# Patient Record
Sex: MALE | Race: WHITE | NOT HISPANIC OR LATINO | Employment: FULL TIME | URBAN - METROPOLITAN AREA
[De-identification: names, ages, dates, MRNs, and addresses within clinical notes are randomized per-mention and may not be internally consistent; named-entity substitution may affect disease eponyms.]

---

## 2019-01-14 ENCOUNTER — HISTORICAL (OUTPATIENT)
Dept: RADIOLOGY | Facility: HOSPITAL | Age: 49
End: 2019-01-14

## 2019-01-25 ENCOUNTER — HISTORICAL (OUTPATIENT)
Dept: RADIOLOGY | Facility: HOSPITAL | Age: 49
End: 2019-01-25

## 2020-08-20 ENCOUNTER — TELEPHONE (OUTPATIENT)
Dept: GASTROENTEROLOGY | Facility: CLINIC | Age: 50
End: 2020-08-20

## 2020-08-20 NOTE — TELEPHONE ENCOUNTER
----- Message from Sanrdo Isaac sent at 8/20/2020  1:38 PM CDT -----  Contact: Patient  Pt called and would like to know if you have any idea when he may be able to get an appt with you    Pt  can be reached at [phone]

## 2020-09-14 ENCOUNTER — TELEPHONE (OUTPATIENT)
Dept: GASTROENTEROLOGY | Facility: CLINIC | Age: 50
End: 2020-09-14

## 2020-09-14 NOTE — TELEPHONE ENCOUNTER
Informed pt his referral is currently 47 out of 85 being worked in queue and that we are slowly but surely getting to the referral. Explained again that she does have a 5-6 month wait period and that we do have him on wait list.

## 2020-09-14 NOTE — TELEPHONE ENCOUNTER
----- Message from Lori Torres sent at 9/14/2020 11:02 AM CDT -----  Patient called to schedule an appointment specifically with Dr Butler  And wishes to speak with a nurse regarding this matter.          can be reached at [phone]    Thanks  KB

## 2020-09-28 ENCOUNTER — TELEPHONE (OUTPATIENT)
Dept: GASTROENTEROLOGY | Facility: CLINIC | Age: 50
End: 2020-09-28

## 2020-09-28 NOTE — TELEPHONE ENCOUNTER
Called pt and scheduled NP appointment for 9/29/20 at 4 pm. Discussed with pt that Dr. Butler is a consultant who will send them back to their general GI provider, , once their symptoms improved and plan of care is established. Pt was told the logistics of the appointment (arrival time, length of visit, and total time spent at facility). Pt was also told that Dr. Butler will review their previous workup but may order additional test and perform her own procedures and that this may require an overnight stay at a local hotel to complete the workup.

## 2020-09-28 NOTE — TELEPHONE ENCOUNTER
----- Message from Mariely Nelson MA sent at 9/25/2020  4:38 PM CDT -----    ----- Message -----  From: Balbina Gates  Sent: 9/25/2020   4:33 PM CDT  To: Carrie PERERA Staff    Good afternoon,    The referring clinic was calling regarding the current pt's referral, we let them know about the time line for new patients but they asked to see if an escalation is possible. If an escalation is not available, please reach out to referring clinic. The referral coordinator is Alana and she can be reached at [phone].    Thank you in advance!  Balbina Gates  Clinic   Ext [phone]

## 2020-09-29 ENCOUNTER — TELEPHONE (OUTPATIENT)
Dept: GASTROENTEROLOGY | Facility: HOSPITAL | Age: 50
End: 2020-09-29

## 2020-09-29 ENCOUNTER — OFFICE VISIT (OUTPATIENT)
Dept: GASTROENTEROLOGY | Facility: CLINIC | Age: 50
End: 2020-09-29
Payer: COMMERCIAL

## 2020-09-29 VITALS
HEIGHT: 72 IN | SYSTOLIC BLOOD PRESSURE: 133 MMHG | BODY MASS INDEX: 38.28 KG/M2 | HEART RATE: 73 BPM | WEIGHT: 282.63 LBS | DIASTOLIC BLOOD PRESSURE: 89 MMHG

## 2020-09-29 DIAGNOSIS — R68.81 EARLY SATIETY: ICD-10-CM

## 2020-09-29 DIAGNOSIS — R63.4 WEIGHT LOSS: ICD-10-CM

## 2020-09-29 DIAGNOSIS — R10.13 EPIGASTRIC PAIN: ICD-10-CM

## 2020-09-29 DIAGNOSIS — K22.4 ESOPHAGEAL SPASM: ICD-10-CM

## 2020-09-29 DIAGNOSIS — R13.10 DYSPHAGIA, UNSPECIFIED TYPE: Primary | ICD-10-CM

## 2020-09-29 DIAGNOSIS — K21.9 GASTROESOPHAGEAL REFLUX DISEASE, ESOPHAGITIS PRESENCE NOT SPECIFIED: ICD-10-CM

## 2020-09-29 PROCEDURE — 99245 PR OFFICE CONSULTATION,LEVEL V: ICD-10-PCS | Mod: S$GLB,,, | Performed by: INTERNAL MEDICINE

## 2020-09-29 PROCEDURE — 99999 PR PBB SHADOW E&M-EST. PATIENT-LVL V: ICD-10-PCS | Mod: PBBFAC,,, | Performed by: INTERNAL MEDICINE

## 2020-09-29 PROCEDURE — 99245 OFF/OP CONSLTJ NEW/EST HI 55: CPT | Mod: S$GLB,,, | Performed by: INTERNAL MEDICINE

## 2020-09-29 PROCEDURE — 99999 PR PBB SHADOW E&M-EST. PATIENT-LVL V: CPT | Mod: PBBFAC,,, | Performed by: INTERNAL MEDICINE

## 2020-09-29 RX ORDER — ATORVASTATIN CALCIUM 10 MG/1
TABLET, FILM COATED ORAL
COMMUNITY
Start: 2020-09-21 | End: 2021-07-14 | Stop reason: CLARIF

## 2020-09-29 RX ORDER — ATENOLOL 50 MG/1
50 TABLET ORAL DAILY
COMMUNITY

## 2020-09-29 RX ORDER — OMEPRAZOLE 40 MG/1
40 CAPSULE, DELAYED RELEASE ORAL DAILY
Status: ON HOLD | COMMUNITY
Start: 2020-08-10 | End: 2020-10-12

## 2020-09-29 RX ORDER — AMITRIPTYLINE HYDROCHLORIDE 25 MG/1
25 TABLET, FILM COATED ORAL NIGHTLY
COMMUNITY
Start: 2020-08-10 | End: 2022-11-23

## 2020-09-29 RX ORDER — DOXAZOSIN 4 MG/1
4 TABLET ORAL NIGHTLY
COMMUNITY

## 2020-09-29 RX ORDER — SILDENAFIL CITRATE 20 MG/1
20 TABLET ORAL 3 TIMES DAILY
Qty: 90 TABLET | Refills: 11 | Status: SHIPPED | OUTPATIENT
Start: 2020-09-29 | End: 2021-01-14

## 2020-09-29 RX ORDER — LISINOPRIL 40 MG/1
40 TABLET ORAL DAILY
COMMUNITY

## 2020-09-29 NOTE — LETTER
September 29, 2020      Enmanuel Sadler MD  [address]  [facility] [address]           [facility]  [address]  [facility] [address]  Phone: [phone]  Fax: [phone]          Patient: Oswaldo Chairez   MR Number: [id number]   YOB: 1970   Date of Visit: 9/29/2020       Dear Dr. Enmanuel Sadler:    Thank you for referring Oswaldo Chairez to me for evaluation. Attached you will find relevant portions of my assessment and plan of care.    If you have questions, please do not hesitate to call me. I look forward to following Oswaldo Chairez along with you.    Sincerely,    Robyn Butler MD    Enclosure  CC:  No Recipients    If you would like to receive this communication electronically, please contact [email or web address] or [phone] to request more information on [employer] access.    For providers and/or their staff who would like to refer a patient to [facility], please contact us through our one-stop-shop provider referral line, [facility], at [phone].    If you feel you have received this communication in error or would no longer like to receive these types of communications, please e-mail [email or web address]

## 2020-09-29 NOTE — TELEPHONE ENCOUNTER
MOTILITY CLINIC PROCEDURE ORDERS    CLEARANCE FOR PROCEDURES:  No needed     PROCEDURES  EGD with EndoFlip   Esophageal manometry with impedance - dysphagia       FLOOR:  4th Floor     PREP    Full liquid diet 3 days       Motility Studies (esophageal manometry/anorectal manometry)  Hold Narcotics x 1 days if able   Hold TCA x 1 days if able  Propofol only during sedation.  Discuss with Dr. Butler if additional sedation needed.     ORDER OF TESTING:  Day 1: EGD w Flip  Day 2: manometry/TBS

## 2020-09-29 NOTE — PATIENT INSTRUCTIONS
-Drink high protein shakes three times per day  - premier protein   -Try sildenafil 20mg three times per day as needed with meals for difficulty swallowing and chest pain  This medication may cause the following side effects.  Discontinue therapy and consult health care provider immediately or go to Emergency Department if you develop any pf the following side effects:   -Significant decreases in blood pressure  -Painful erection for more than 4 hours.  -Vision loss: Sudden loss of vision in one or both eyes, including permanent loss of vision  -Complete labs   -Complete EGD, EndoFlip, manometry and esophagram

## 2020-09-29 NOTE — H&P (VIEW-ONLY)
[facility] Consultation Note    Reason for Consult:    Chief Complaint   Patient presents with    Dysphagia    Heartburn    Weight Loss    Gastroesophageal Reflux         PCP:   Antonio Staples   [address] [place] [address]    Referring MD:  Enmanuel Sadler Md  [address] [address]      HPI:  Oswaldo Chairez is a 50 y.o. male referred to motility clinic for second opinion regarding the following problems:    Referral  for esophageal dysmotility.  Notes 05/28/2020:  50-year-old male with esophageal dysmotility had 100 % simultaneous contractions.  Elevated LES pressure of 72 base line and 19 for residual.  No improvement with Levsin.  Miserable and ready to do something.  This is possibly type 2 achalasia given the similar changes contractions and pan esophageal pressurization of the esophagus.  Cousin also be EG junction outflow obstruction but given there is pan esophageal pressurization this could be evolving achalasia type 2.  He lost about 10 lb and has been trying to diet.  Notes 04/06/2020:  49-year-old man complaining of esophageal spasm.  Problems in daily basis for at least 4-5 times per week.  It pain and difficulty swallowing is immediate. Ttries to drink liquid the problem will be worse and he will have to throw up.  He also gets lightheaded and almost passes out.  B.i.d. PPI has not helped.  Patient states that GERD is well controlled.  No weight loss.    GERD.   Retrosternal pyrosis: occasional   Regurgitation: few per week     Onset: most of his life, worse 2/2020  Upright symptoms: both  Nocturnal symptoms:  both  Hoarseness: no   Cough:yes  Throat clearing: occasion   Caffeine intake: 3-4 cups per day   Sleeps with head of the bed elevated. 2 pillows   Avoids eating prior to bedtime.  1.5-2h  Worse when sleep on L side    Belching Occasional. Complains that he is unable to belch when feels unwell.     Wakes up at night  coughing/wheezing, concerning for aspiration   Few per month   Progressively worse in the past few months     MEDS:   Omeprazole 40mg QHS before bed time  Alkasaltzer prn    Dysphagia.  Reports difficulty swallowing.  Problems with solids: yes - more  Problems with liquids:yes  Choking while eating:  no  Coughing wile eating: no  Frequency:  daily  Location: lower esophagus  Onset of symptoms: 2.5 yrs, worse 2/2020  History of food impactions requiring ED visit: no  History of allergies/eczema. no   No narcotics  Dilation 2/2019 without improvement    Chest pain. Reports bothersome chest pain and spasms of esophagus.   Onset: Few years ago  Lower esophagus, radiated to the back   Resolves w cold drink   Start spontaneously   Few per week   Triggers (cold fluids, caffeine, smoking, ETOH): spontanously, not w eating   Negative cardiac workup:  Negative cardiac sky. Mother w CHF. Negative stress 1/2020, recent Echo negative per pt. Cardiology does not think this is cardiac   No ASA  Has syncope/presyncope during episodes of dysphagia.  One episode of passing out if he stands up.  Told that has a vasovagal response from esophagus.     Elavil 25mg QHS - for 1 month without improvement   Omeprazole 40mg   Peppermint - uses altoids prn - not helping   Sl nitro - did not help   No nifedipine  No diltiazem  No isosorrbide dinitrate  No trazodone  No Botox    Achalasia Eckardt Score  Dysphagia  (none (0), occasional (1), daily (2), with every meal (3)): 2  Regurgitation (none (0), occasional (1), daily (2), with every meal (3)): 2  Chest pain (none (0), occasional (1), daily (2), several times per day (3)): 1  Weight loss (kg) (0 (0), <5 (1), 5-10 (2), >10 (3)): 3  Total Eckardt Score(the final score is the sum of four components (0-12)):  8    Early satiety.   Started in the past 6 month     Epigastric pain/chest pain.  Radiating to the back     Weigh loss.   295 to 275lb   20lb in 6 months   Stable in past month   No protein  [facility]      ED 5/2020 w felt unwell, anxious, lightheaded   No abd pain radiating to the back, no n/v  Told that had mild pancreatitis  Lipase elevated 254H - 5/10/2020  Ct negative      Denies nausea, vomiting, abdominal pain, bloating, diarrhea, constipation, BRBPR, melena.       Total visit time was 90 minutes, more than 50% of which was spent in face-to-face counseling with patient regarding symptoms, diagnostic results, prognosis, risks and benefits of treatment options, instructions for management, importance of compliance with chosen treatment options and coping strategies.      Previous Studies:   I spent 15 minutes on 9/29/2020 at 12:19pm reviewing Oswaldo Chairez medical records prior to clinic visit on 9/29/2020.   Chest x-ray 05/10/2020:  No acute cardiopulmonary process.  CT abdomen 05/10/2020:  No acute abdominal or pelvic inflammatory process.  9 mm left adrenal nodule statistically most likely an adenoma.   Manometry 05/18/2020:  EG junction outflow obstruction.  Some instances of intact or weak peristalsis.  IRP 19.1.  Basal LES 75.3.  100% simultaneous swallows.  DCI 1284 incomplete bolus clearance 0%.  Available manometric tracings personally reviewed by me.  EGD 02/05/2019:  Normal mucosa in the whole esophagus.  GEJ (-).  Dilation up to 54 Mongolian bougie.  Granularity, erythema and congestion in the antrum compatible with gastritis (reactive gastritis).  Normal mucosa in the whole duodenum (-).  HIDA scan 1/2019 with 18% ejection fraction but did not reproduce symptoms.     Labs hemoglobin 13.5 MCV 91.2.  Amylase and lipase normal.  CMP normal TSH 2.2    Relevant Surgical History:    None    ROS:  ROS   Constitutional: No fevers, no chills, no night sweats, + weight loss  ENT: No congestion, no rhinorrhea, no chronic sinus problems  CV: No chest pain, no palpitations  Pulm: No cough, no shortness of breath  Ophtho: No blurry vision, no eye redness  GI: see HPI  Derm: No rash  Heme: No  lymphadenopathy, no bruising  MSK: No arthritis, no joint swelling, no Raynauds  : No dysuria, no frequent urination, no blood in urine  Endo: No hot or cold intolerance  Neuro: No dizziness, no syncope, no seizure  Psych: No anxiety, no depression      Medical History:   Past Medical History:   Diagnosis Date    Esophageal spasm     HLD (hyperlipidemia)     HTN (hypertension)     HTN (hypertension)         Surgical History:   Past Surgical History:   Procedure Laterality Date    COLONOSCOPY      MINIMALLY INVASIVE MICRODISCECTOMY LUMBAR SPINE      total knee replacement      UPPER GASTROINTESTINAL ENDOSCOPY          Family History:   Family History   Problem Relation Age of Onset    Heart failure Mother     Colon cancer Father 78    Celiac disease Neg Hx     Colon polyps Neg Hx     Crohn's disease Neg Hx     Esophageal cancer Neg Hx     Cystic fibrosis Neg Hx     Hemochromatosis Neg Hx     Inflammatory bowel disease Neg Hx     Irritable bowel syndrome Neg Hx     Liver disease Neg Hx     Rectal cancer Neg Hx     Liver cancer Neg Hx     Stomach cancer Neg Hx     Ulcerative colitis Neg Hx     Tuberculosis Neg Hx     Lymphoma Neg Hx     Scleroderma Neg Hx     Rheum arthritis Neg Hx     Lupus Neg Hx     Melanoma Neg Hx     Multiple sclerosis Neg Hx     Psoriasis Neg Hx     Skin cancer Neg Hx         Social History:   Social History     Socioeconomic History    Marital status:      Spouse name: Not on file    Number of children: Not on file    Years of education: Not on file    Highest education level: Not on file   Occupational History    Not on file   Social Needs    Financial resource strain: Not on file    Food insecurity     Worry: Not on file     Inability: Not on file    Transportation needs     Medical: Not on file     Non-medical: Not on file   Tobacco Use    Smoking status: Never Smoker    Smokeless tobacco: Never Used   Substance and Sexual Activity     Alcohol use: Yes     Comment: occasional    Drug use: Never    Sexual activity: Not on file   Lifestyle    Physical activity     Days per week: Not on file     Minutes per session: Not on file    Stress: Not on file   Relationships    Social connections     Talks on phone: Not on file     Gets together: Not on file     Attends Confucianism service: Not on file     Active member of club or organization: Not on file     Attends meetings of clubs or organizations: Not on file     Relationship status: Not on file   Other Topics Concern    Not on file   Social History Narrative    Not on file        Review of patient's allergies indicates:  No Known Allergies    Current Outpatient Medications   Medication Sig Dispense Refill    atenoloL (TENORMIN) 50 MG tablet Take 50 mg by mouth once daily.      atorvastatin (LIPITOR) 10 MG tablet TAKE 1 TABLET BY MOUTH ONCE DAILY FOR 30 DAYS      doxazosin (CARDURA) 4 MG tablet Take 4 mg by mouth every evening.      lisinopriL (PRINIVIL,ZESTRIL) 40 MG tablet Take 40 mg by mouth once daily.      amitriptyline (ELAVIL) 25 MG tablet Take 25 mg by mouth nightly. at bedtime.      omeprazole (PRILOSEC) 40 MG capsule Take 40 mg by mouth once daily.      sildenafil (REVATIO) 20 mg Tab Take 1 tablet (20 mg total) by mouth 3 (three) times daily. 90 tablet 11     No current facility-administered medications for this visit.         Objective Findings:  Vital Signs:  /89   Pulse 73   Ht 6' (1.829 m)   Wt 128.2 kg (282 lb 10.1 oz)   BMI 38.33 kg/m²   Body mass index is 38.33 kg/m².    Physical Exam:  General appearance: alert, cooperative, no distress  HENT: Normocephalic, atraumatic, neck symmetrical, no nasal discharge  Eyes: conjunctivae/corneas clear, PERRL, EOM's intact  Lungs: clear to auscultation bilaterally, no dullness to percussion bilaterally  Heart: regular rate and rhythm without rub; no displacement of the PMI  Abdomen: soft, non-tender; bowel sounds normoactive;  no organomegaly  Extremities: extremities symmetric; no clubbing, cyanosis, or edema  Integument: Skin color, texture, turgor normal; no rashes; hair distrubution normal  Neurologic: Alert and oriented X 3, normal strength, normal coordination and gait  Psychiatric: no pressured speech; normal affect; no evidence of impaired cognition    Labs:   Reviewed      Assessment and Plan:  Oswaldo Chairez is a 50 y.o. male with HTN, HLD refer to motility Clinic for 2nd opinion regarding the following problems:    GERD. Pyrosis and regurgitation. Wakes up at night coughing/wheezing, concerning for aspiration.Worse when on L side  -On omeprazole 40mg at night     Dysphagia to solids and liquids. Eckardt Score 8/12.  Manometry at OSH w EGJOO and 100% simultaneous swallows   Dilation 54Fr 2/2019 without improvement  Elavil 25mg QHS - for 1 month without improvement   No improvement w peppermint  No improvement w SL nitro  -Check EGD w E/G bx, EndoFlip   -Manometry   -TBS  -Start sildenafil for esophageal spasm     Chest pain and spasms of esophagus. Radiating to the back   Resolves w cold drink   Negative cardiac workup. Cardiology does not think this is cardiac   Has syncope/presyncope during episodes of dysphagia.    -labs     New early satiety.     Anemia  -labs     CRC screening   -Defer to ref GI    Weigh loss. 20lb in 6 months. Stable in past month   Protein shakes TID     Seen in ED 5/2020 anxious, lightheaded   No abd pain radiating to the back, no n/v  Told that had mild pancreatitis  Lipase elevated 254H. Ct negative   -Def to ref GI    No follow-ups on file.    1. Dysphagia, unspecified type    2. Epigastric pain    3. Gastroesophageal reflux disease, esophagitis presence not specified    4. Esophageal spasm    5. Early satiety    6. Weight loss          Order summary:  Orders Placed This Encounter    FL Esophagram Complete    CBC auto differential    Comprehensive metabolic panel    Lipase    TSH    sildenafil  (REVATIO) 20 mg Tab    Case request GI: ESOPHAGOGASTRODUODENOSCOPY (EGD)    Case request GI: MANOMETRY-ESOPHAGEAL-WITH IMPEDANCE         Thank you so much for allowing me to participate in the care of Oswaldo Butler MD          \

## 2020-09-29 NOTE — PROGRESS NOTES
[facility] Consultation Note    Reason for Consult:    Chief Complaint   Patient presents with    Dysphagia    Heartburn    Weight Loss    Gastroesophageal Reflux         PCP:   Antonio Staples   [address] [place] [address]    Referring MD:  Enmanuel Sadler Md  [address] [address]      HPI:  Oswaldo Chairez is a 50 y.o. male referred to motility clinic for second opinion regarding the following problems:    Referral  for esophageal dysmotility.  Notes 05/28/2020:  50-year-old male with esophageal dysmotility had 100 % simultaneous contractions.  Elevated LES pressure of 72 base line and 19 for residual.  No improvement with Levsin.  Miserable and ready to do something.  This is possibly type 2 achalasia given the similar changes contractions and pan esophageal pressurization of the esophagus.  Cousin also be EG junction outflow obstruction but given there is pan esophageal pressurization this could be evolving achalasia type 2.  He lost about 10 lb and has been trying to diet.  Notes 04/06/2020:  49-year-old man complaining of esophageal spasm.  Problems in daily basis for at least 4-5 times per week.  It pain and difficulty swallowing is immediate. Ttries to drink liquid the problem will be worse and he will have to throw up.  He also gets lightheaded and almost passes out.  B.i.d. PPI has not helped.  Patient states that GERD is well controlled.  No weight loss.    GERD.   Retrosternal pyrosis: occasional   Regurgitation: few per week     Onset: most of his life, worse 2/2020  Upright symptoms: both  Nocturnal symptoms:  both  Hoarseness: no   Cough:yes  Throat clearing: occasion   Caffeine intake: 3-4 cups per day   Sleeps with head of the bed elevated. 2 pillows   Avoids eating prior to bedtime.  1.5-2h  Worse when sleep on L side    Belching Occasional. Complains that he is unable to belch when feels unwell.     Wakes up at night  coughing/wheezing, concerning for aspiration   Few per month   Progressively worse in the past few months     MEDS:   Omeprazole 40mg QHS before bed time  Alkasaltzer prn    Dysphagia.  Reports difficulty swallowing.  Problems with solids: yes - more  Problems with liquids:yes  Choking while eating:  no  Coughing wile eating: no  Frequency:  daily  Location: lower esophagus  Onset of symptoms: 2.5 yrs, worse 2/2020  History of food impactions requiring ED visit: no  History of allergies/eczema. no   No narcotics  Dilation 2/2019 without improvement    Chest pain. Reports bothersome chest pain and spasms of esophagus.   Onset: Few years ago  Lower esophagus, radiated to the back   Resolves w cold drink   Start spontaneously   Few per week   Triggers (cold fluids, caffeine, smoking, ETOH): spontanously, not w eating   Negative cardiac workup:  Negative cardiac sky. Mother w CHF. Negative stress 1/2020, recent Echo negative per pt. Cardiology does not think this is cardiac   No ASA  Has syncope/presyncope during episodes of dysphagia.  One episode of passing out if he stands up.  Told that has a vasovagal response from esophagus.     Elavil 25mg QHS - for 1 month without improvement   Omeprazole 40mg   Peppermint - uses altoids prn - not helping   Sl nitro - did not help   No nifedipine  No diltiazem  No isosorrbide dinitrate  No trazodone  No Botox    Achalasia Eckardt Score  Dysphagia  (none (0), occasional (1), daily (2), with every meal (3)): 2  Regurgitation (none (0), occasional (1), daily (2), with every meal (3)): 2  Chest pain (none (0), occasional (1), daily (2), several times per day (3)): 1  Weight loss (kg) (0 (0), <5 (1), 5-10 (2), >10 (3)): 3  Total Eckardt Score(the final score is the sum of four components (0-12)):  8    Early satiety.   Started in the past 6 month     Epigastric pain/chest pain.  Radiating to the back     Weigh loss.   295 to 275lb   20lb in 6 months   Stable in past month   No protein  [facility]      ED 5/2020 w felt unwell, anxious, lightheaded   No abd pain radiating to the back, no n/v  Told that had mild pancreatitis  Lipase elevated 254H - 5/10/2020  Ct negative      Denies nausea, vomiting, abdominal pain, bloating, diarrhea, constipation, BRBPR, melena.       Total visit time was 90 minutes, more than 50% of which was spent in face-to-face counseling with patient regarding symptoms, diagnostic results, prognosis, risks and benefits of treatment options, instructions for management, importance of compliance with chosen treatment options and coping strategies.      Previous Studies:   I spent 15 minutes on 9/29/2020 at 12:19pm reviewing Oswaldo Chairez medical records prior to clinic visit on 9/29/2020.   Chest x-ray 05/10/2020:  No acute cardiopulmonary process.  CT abdomen 05/10/2020:  No acute abdominal or pelvic inflammatory process.  9 mm left adrenal nodule statistically most likely an adenoma.   Manometry 05/18/2020:  EG junction outflow obstruction.  Some instances of intact or weak peristalsis.  IRP 19.1.  Basal LES 75.3.  100% simultaneous swallows.  DCI 1284 incomplete bolus clearance 0%.  Available manometric tracings personally reviewed by me.  EGD 02/05/2019:  Normal mucosa in the whole esophagus.  GEJ (-).  Dilation up to 54 Bengali bougie.  Granularity, erythema and congestion in the antrum compatible with gastritis (reactive gastritis).  Normal mucosa in the whole duodenum (-).  HIDA scan 1/2019 with 18% ejection fraction but did not reproduce symptoms.     Labs hemoglobin 13.5 MCV 91.2.  Amylase and lipase normal.  CMP normal TSH 2.2    Relevant Surgical History:    None    ROS:  ROS   Constitutional: No fevers, no chills, no night sweats, + weight loss  ENT: No congestion, no rhinorrhea, no chronic sinus problems  CV: No chest pain, no palpitations  Pulm: No cough, no shortness of breath  Ophtho: No blurry vision, no eye redness  GI: see HPI  Derm: No rash  Heme: No  lymphadenopathy, no bruising  MSK: No arthritis, no joint swelling, no Raynauds  : No dysuria, no frequent urination, no blood in urine  Endo: No hot or cold intolerance  Neuro: No dizziness, no syncope, no seizure  Psych: No anxiety, no depression      Medical History:   Past Medical History:   Diagnosis Date    Esophageal spasm     HLD (hyperlipidemia)     HTN (hypertension)     HTN (hypertension)         Surgical History:   Past Surgical History:   Procedure Laterality Date    COLONOSCOPY      MINIMALLY INVASIVE MICRODISCECTOMY LUMBAR SPINE      total knee replacement      UPPER GASTROINTESTINAL ENDOSCOPY          Family History:   Family History   Problem Relation Age of Onset    Heart failure Mother     Colon cancer Father 78    Celiac disease Neg Hx     Colon polyps Neg Hx     Crohn's disease Neg Hx     Esophageal cancer Neg Hx     Cystic fibrosis Neg Hx     Hemochromatosis Neg Hx     Inflammatory bowel disease Neg Hx     Irritable bowel syndrome Neg Hx     Liver disease Neg Hx     Rectal cancer Neg Hx     Liver cancer Neg Hx     Stomach cancer Neg Hx     Ulcerative colitis Neg Hx     Tuberculosis Neg Hx     Lymphoma Neg Hx     Scleroderma Neg Hx     Rheum arthritis Neg Hx     Lupus Neg Hx     Melanoma Neg Hx     Multiple sclerosis Neg Hx     Psoriasis Neg Hx     Skin cancer Neg Hx         Social History:   Social History     Socioeconomic History    Marital status:      Spouse name: Not on file    Number of children: Not on file    Years of education: Not on file    Highest education level: Not on file   Occupational History    Not on file   Social Needs    Financial resource strain: Not on file    Food insecurity     Worry: Not on file     Inability: Not on file    Transportation needs     Medical: Not on file     Non-medical: Not on file   Tobacco Use    Smoking status: Never Smoker    Smokeless tobacco: Never Used   Substance and Sexual Activity     Alcohol use: Yes     Comment: occasional    Drug use: Never    Sexual activity: Not on file   Lifestyle    Physical activity     Days per week: Not on file     Minutes per session: Not on file    Stress: Not on file   Relationships    Social connections     Talks on phone: Not on file     Gets together: Not on file     Attends Confucianist service: Not on file     Active member of club or organization: Not on file     Attends meetings of clubs or organizations: Not on file     Relationship status: Not on file   Other Topics Concern    Not on file   Social History Narrative    Not on file        Review of patient's allergies indicates:  No Known Allergies    Current Outpatient Medications   Medication Sig Dispense Refill    atenoloL (TENORMIN) 50 MG tablet Take 50 mg by mouth once daily.      atorvastatin (LIPITOR) 10 MG tablet TAKE 1 TABLET BY MOUTH ONCE DAILY FOR 30 DAYS      doxazosin (CARDURA) 4 MG tablet Take 4 mg by mouth every evening.      lisinopriL (PRINIVIL,ZESTRIL) 40 MG tablet Take 40 mg by mouth once daily.      amitriptyline (ELAVIL) 25 MG tablet Take 25 mg by mouth nightly. at bedtime.      omeprazole (PRILOSEC) 40 MG capsule Take 40 mg by mouth once daily.      sildenafil (REVATIO) 20 mg Tab Take 1 tablet (20 mg total) by mouth 3 (three) times daily. 90 tablet 11     No current facility-administered medications for this visit.         Objective Findings:  Vital Signs:  /89   Pulse 73   Ht 6' (1.829 m)   Wt 128.2 kg (282 lb 10.1 oz)   BMI 38.33 kg/m²   Body mass index is 38.33 kg/m².    Physical Exam:  General appearance: alert, cooperative, no distress  HENT: Normocephalic, atraumatic, neck symmetrical, no nasal discharge  Eyes: conjunctivae/corneas clear, PERRL, EOM's intact  Lungs: clear to auscultation bilaterally, no dullness to percussion bilaterally  Heart: regular rate and rhythm without rub; no displacement of the PMI  Abdomen: soft, non-tender; bowel sounds normoactive;  no organomegaly  Extremities: extremities symmetric; no clubbing, cyanosis, or edema  Integument: Skin color, texture, turgor normal; no rashes; hair distrubution normal  Neurologic: Alert and oriented X 3, normal strength, normal coordination and gait  Psychiatric: no pressured speech; normal affect; no evidence of impaired cognition    Labs:   Reviewed      Assessment and Plan:  Oswaldo Chairez is a 50 y.o. male with HTN, HLD refer to motility Clinic for 2nd opinion regarding the following problems:    GERD. Pyrosis and regurgitation. Wakes up at night coughing/wheezing, concerning for aspiration.Worse when on L side  -On omeprazole 40mg at night     Dysphagia to solids and liquids. Eckardt Score 8/12.  Manometry at OSH w EGJOO and 100% simultaneous swallows   Dilation 54Fr 2/2019 without improvement  Elavil 25mg QHS - for 1 month without improvement   No improvement w peppermint  No improvement w SL nitro  -Check EGD w E/G bx, EndoFlip   -Manometry   -TBS  -Start sildenafil for esophageal spasm     Chest pain and spasms of esophagus. Radiating to the back   Resolves w cold drink   Negative cardiac workup. Cardiology does not think this is cardiac   Has syncope/presyncope during episodes of dysphagia.    -labs     New early satiety.     Anemia  -labs     CRC screening   -Defer to ref GI    Weigh loss. 20lb in 6 months. Stable in past month   Protein shakes TID     Seen in ED 5/2020 anxious, lightheaded   No abd pain radiating to the back, no n/v  Told that had mild pancreatitis  Lipase elevated 254H. Ct negative   -Def to ref GI    No follow-ups on file.    1. Dysphagia, unspecified type    2. Epigastric pain    3. Gastroesophageal reflux disease, esophagitis presence not specified    4. Esophageal spasm    5. Early satiety    6. Weight loss          Order summary:  Orders Placed This Encounter    FL Esophagram Complete    CBC auto differential    Comprehensive metabolic panel    Lipase    TSH    sildenafil  (REVATIO) 20 mg Tab    Case request GI: ESOPHAGOGASTRODUODENOSCOPY (EGD)    Case request GI: MANOMETRY-ESOPHAGEAL-WITH IMPEDANCE         Thank you so much for allowing me to participate in the care of Oswaldo Butler MD          \

## 2020-09-30 ENCOUNTER — TELEPHONE (OUTPATIENT)
Dept: ENDOSCOPY | Facility: HOSPITAL | Age: 50
End: 2020-09-30

## 2020-09-30 NOTE — TELEPHONE ENCOUNTER
Patient is scheduled for EGD on 10/12/20 and Manometry on 10/13/20 at 10:00am will be done by 12p.    He would like to coordinate the rest of his tests on that Tuesday after his procedures.

## 2020-09-30 NOTE — TELEPHONE ENCOUNTER
Instructions for procedures with Dr. Butler on 10/12 and 10/13 emailed to [email or web address]

## 2020-09-30 NOTE — TELEPHONE ENCOUNTER
I would have to move both procedures bc his COVID test is going to cover him for both so I cannot move his Manometry back to the 14th.    Have you talked to him and see if he wants to do the esophogram a separate day or move everything again?

## 2020-09-30 NOTE — TELEPHONE ENCOUNTER
If he wants to move them all to have together it wont be until Nov 2 and 3rd or Nov 16th and 17th weeks

## 2020-09-30 NOTE — TELEPHONE ENCOUNTER
Called patient regarding orders with Dr. Butler for procedures. No answer and message was left for him to call my direct line back to schedule

## 2020-10-06 ENCOUNTER — TELEPHONE (OUTPATIENT)
Dept: GASTROENTEROLOGY | Facility: CLINIC | Age: 50
End: 2020-10-06

## 2020-10-06 NOTE — TELEPHONE ENCOUNTER
Labs at the outside hospital 09/30/2020:  CMP normal  TSH normal  CBC normal   lipase 216 high (13-78)      Please let patient know that his labs show elevated lipase.  He should address this with the referring GI doctor.  Please fax labs to referring GI

## 2020-10-12 ENCOUNTER — ANESTHESIA EVENT (OUTPATIENT)
Dept: ENDOSCOPY | Facility: HOSPITAL | Age: 50
End: 2020-10-12
Payer: COMMERCIAL

## 2020-10-12 ENCOUNTER — TELEPHONE (OUTPATIENT)
Dept: GASTROENTEROLOGY | Facility: CLINIC | Age: 50
End: 2020-10-12

## 2020-10-12 ENCOUNTER — HOSPITAL ENCOUNTER (OUTPATIENT)
Facility: HOSPITAL | Age: 50
Discharge: HOME | End: 2020-10-12
Attending: INTERNAL MEDICINE | Admitting: INTERNAL MEDICINE
Payer: COMMERCIAL

## 2020-10-12 ENCOUNTER — ANESTHESIA (OUTPATIENT)
Dept: ENDOSCOPY | Facility: HOSPITAL | Age: 50
End: 2020-10-12
Payer: COMMERCIAL

## 2020-10-12 VITALS
BODY MASS INDEX: 37.93 KG/M2 | TEMPERATURE: 98 F | SYSTOLIC BLOOD PRESSURE: 130 MMHG | RESPIRATION RATE: 16 BRPM | HEART RATE: 63 BPM | DIASTOLIC BLOOD PRESSURE: 88 MMHG | HEIGHT: 72 IN | WEIGHT: 280 LBS | OXYGEN SATURATION: 98 %

## 2020-10-12 DIAGNOSIS — R13.10 DYSPHAGIA: ICD-10-CM

## 2020-10-12 DIAGNOSIS — R13.10 DYSPHAGIA, UNSPECIFIED TYPE: Primary | ICD-10-CM

## 2020-10-12 LAB — SARS-COV-2 RDRP RESP QL NAA+PROBE: NEGATIVE

## 2020-10-12 PROCEDURE — E9220 PRA ENDO ANESTHESIA: HCPCS | Mod: ,,, | Performed by: NURSE ANESTHETIST, CERTIFIED REGISTERED

## 2020-10-12 PROCEDURE — 43239 PR EGD, FLEX, W/BIOPSY, SGL/MULTI: ICD-10-PCS | Mod: ,,, | Performed by: INTERNAL MEDICINE

## 2020-10-12 PROCEDURE — 91040 ESOPH BALLOON DISTENSION TST: CPT | Performed by: INTERNAL MEDICINE

## 2020-10-12 PROCEDURE — 27201012 HC FORCEPS, HOT/COLD, DISP: Performed by: INTERNAL MEDICINE

## 2020-10-12 PROCEDURE — 91037 ESOPH IMPED FUNCTION TEST: CPT | Performed by: INTERNAL MEDICINE

## 2020-10-12 PROCEDURE — 91040 ESOPH BALLOON DISTENSION TST: CPT | Mod: 26,,, | Performed by: INTERNAL MEDICINE

## 2020-10-12 PROCEDURE — C1726 CATH, BAL DIL, NON-VASCULAR: HCPCS | Performed by: INTERNAL MEDICINE

## 2020-10-12 PROCEDURE — 88305 TISSUE EXAM BY PATHOLOGIST: ICD-10-PCS | Mod: 26,,, | Performed by: PATHOLOGY

## 2020-10-12 PROCEDURE — E9220 PRA ENDO ANESTHESIA: ICD-10-PCS | Mod: ,,, | Performed by: NURSE ANESTHETIST, CERTIFIED REGISTERED

## 2020-10-12 PROCEDURE — 37000008 HC ANESTHESIA 1ST 15 MINUTES: Performed by: INTERNAL MEDICINE

## 2020-10-12 PROCEDURE — 63600175 PHARM REV CODE 636 W HCPCS: Performed by: NURSE ANESTHETIST, CERTIFIED REGISTERED

## 2020-10-12 PROCEDURE — 43239 EGD BIOPSY SINGLE/MULTIPLE: CPT | Mod: ,,, | Performed by: INTERNAL MEDICINE

## 2020-10-12 PROCEDURE — 25000003 PHARM REV CODE 250: Performed by: INTERNAL MEDICINE

## 2020-10-12 PROCEDURE — 43239 EGD BIOPSY SINGLE/MULTIPLE: CPT | Performed by: INTERNAL MEDICINE

## 2020-10-12 PROCEDURE — U0002 COVID-19 LAB TEST NON-CDC: HCPCS

## 2020-10-12 PROCEDURE — 91038 ESOPH IMPED FUNCT TEST > 1HR: CPT | Performed by: INTERNAL MEDICINE

## 2020-10-12 PROCEDURE — 91040 PR ESOPH BALLOON DISTENSION TST: ICD-10-PCS | Mod: 26,,, | Performed by: INTERNAL MEDICINE

## 2020-10-12 PROCEDURE — 37000009 HC ANESTHESIA EA ADD 15 MINS: Performed by: INTERNAL MEDICINE

## 2020-10-12 PROCEDURE — 88305 TISSUE EXAM BY PATHOLOGIST: CPT | Mod: 26,,, | Performed by: PATHOLOGY

## 2020-10-12 PROCEDURE — 88305 TISSUE EXAM BY PATHOLOGIST: CPT | Mod: 59 | Performed by: PATHOLOGY

## 2020-10-12 RX ORDER — PROPOFOL 10 MG/ML
VIAL (ML) INTRAVENOUS CONTINUOUS PRN
Status: DISCONTINUED | OUTPATIENT
Start: 2020-10-12 | End: 2020-10-12

## 2020-10-12 RX ORDER — OMEPRAZOLE 40 MG/1
40 CAPSULE, DELAYED RELEASE ORAL EVERY MORNING
Qty: 90 CAPSULE | Refills: 3 | Status: SHIPPED | OUTPATIENT
Start: 2020-10-12 | End: 2022-11-23

## 2020-10-12 RX ORDER — SODIUM CHLORIDE 9 MG/ML
INJECTION, SOLUTION INTRAVENOUS CONTINUOUS
Status: DISCONTINUED | OUTPATIENT
Start: 2020-10-12 | End: 2020-10-12 | Stop reason: HOSPADM

## 2020-10-12 RX ORDER — PROPOFOL 10 MG/ML
VIAL (ML) INTRAVENOUS
Status: DISCONTINUED | OUTPATIENT
Start: 2020-10-12 | End: 2020-10-12

## 2020-10-12 RX ORDER — LIDOCAINE HCL/PF 100 MG/5ML
SYRINGE (ML) INTRAVENOUS
Status: DISCONTINUED | OUTPATIENT
Start: 2020-10-12 | End: 2020-10-12

## 2020-10-12 RX ORDER — SODIUM CHLORIDE 0.9 % (FLUSH) 0.9 %
10 SYRINGE (ML) INJECTION
Status: DISCONTINUED | OUTPATIENT
Start: 2020-10-12 | End: 2020-10-12 | Stop reason: HOSPADM

## 2020-10-12 RX ADMIN — Medication 100 MG: at 01:10

## 2020-10-12 RX ADMIN — SODIUM CHLORIDE: 0.9 INJECTION, SOLUTION INTRAVENOUS at 01:10

## 2020-10-12 RX ADMIN — PROPOFOL 200 MCG/KG/MIN: 10 INJECTION, EMULSION INTRAVENOUS at 01:10

## 2020-10-12 RX ADMIN — SODIUM CHLORIDE: 0.9 INJECTION, SOLUTION INTRAVENOUS at 12:10

## 2020-10-12 RX ADMIN — PROPOFOL 150 MG: 10 INJECTION, EMULSION INTRAVENOUS at 01:10

## 2020-10-12 NOTE — ANESTHESIA PREPROCEDURE EVALUATION
10/12/2020  Oswaldo Chairez is a 50 y.o., male.  There is no problem list on file for this patient.       Past Medical History:   Diagnosis Date    Esophageal spasm     HLD (hyperlipidemia)     HTN (hypertension)     HTN (hypertension)          Anesthesia Evaluation    I have reviewed the Patient Summary Reports.      I have reviewed the Medications.     Review of Systems  Anesthesia Hx:  No problems with previous Anesthesia  Denies Family Hx of Anesthesia complications.   Denies Personal Hx of Anesthesia complications.   Hematology/Oncology:  Hematology Normal   Oncology Normal     EENT/Dental:EENT/Dental Normal   Cardiovascular:   Exercise tolerance: good Hypertension    Pulmonary:  Pulmonary Normal    Renal/:  Renal/ Normal     Hepatic/GI:  Hepatic/GI Normal    Musculoskeletal:  Musculoskeletal Normal    Neurological:  Neurology Normal    Endocrine:  Endocrine Normal    Dermatological:  Skin Normal    Psych:  Psychiatric Normal           Physical Exam  General:  Morbid Obesity    Airway/Jaw/Neck:  Airway Findings: Mouth Opening: Normal Tongue: Normal  General Airway Assessment: Adult  TM Distance: Normal, at least 6 cm       Chest/Lungs:  Chest/Lungs Clear    Heart/Vascular:  Heart Findings: Normal Heart murmur: negative            Anesthesia Plan  Type of Anesthesia, risks & benefits discussed:  Anesthesia Type:  general  Patient's Preference:   Intra-op Monitoring Plan: standard ASA monitors  Intra-op Monitoring Plan Comments:   Post Op Pain Control Plan:   Post Op Pain Control Plan Comments:   Induction:   IV  Beta Blocker:  Patient is on a Beta-Blocker and has received one dose within the past 24 hours (No further documentation required).       Informed Consent: Patient understands risks and agrees with Anesthesia plan.  Questions answered. Anesthesia consent signed with patient.  ASA Score: 3      Day of Surgery Review of History & Physical:    H&P update referred to the surgeon.         Ready For Surgery From Anesthesia Perspective.

## 2020-10-12 NOTE — TRANSFER OF CARE
Anesthesia Transfer of Care Note    Patient: Oswaldo Chairez    Procedure(s) Performed: Procedure(s) (LRB):  ESOPHAGOGASTRODUODENOSCOPY (EGD) (N/A)    Patient location: PACU    Anesthesia Type: general    Transport from OR: Transported from OR on room air with adequate spontaneous ventilation    Post pain: adequate analgesia    Post assessment: no apparent anesthetic complications and tolerated procedure well    Post vital signs: stable    Level of consciousness: sedated    Nausea/Vomiting: no nausea/vomiting    Complications: none    Transfer of care protocol was followed      Last vitals:   Visit Vitals  /86 (BP Location: Left arm, Patient Position: Lying)   Pulse 71   Temp 36.4 °C (97.5 °F) (Oral)   Resp 16   Ht 6' (1.829 m)   Wt 127 kg (280 lb)   SpO2 99%   BMI 37.97 kg/m²

## 2020-10-12 NOTE — INTERVAL H&P NOTE
Pre-Procedure H and P Addendum    Patient seen and examined.  History and exam unchanged from prior history and physical.      Procedure: EGD w Endoflip  Indication: dysphagia/chest pain  ASA Class: per anesthesiology  Airway: per anesthesia  Neck Mobility: full range of motion  Mallampatti score: per anesthesia  History of anesthesia problems: no  Family history of anesthesia problems: no  Anesthesia Plan: per anesthesia          There are no hospital problems to display for this patient.

## 2020-10-12 NOTE — [employer] PATIENT INSTRUCTIONS
Discharge Summary/Instructions after an Endoscopic Procedure  Patient Name: Oswaldo Chairez  Patient MRN: [id number]  Patient YOB: 1970 Monday, October 12, 2020  Robyn Butler MD  RESTRICTIONS:  During your procedure today, you received medications for sedation.  These   medications may affect your judgment, balance and coordination.  Therefore,   for 24 hours, you have the following restrictions:   - DO NOT drive a car, operate machinery, make legal/financial decisions,   sign important papers or drink alcohol.    ACTIVITY:  Today: no heavy lifting, straining or running due to procedural   sedation/anesthesia.  The following day: return to full activity including work.  DIET:  Eat and drink normally unless instructed otherwise.     TREATMENT FOR COMMON SIDE EFFECTS:  - Mild abdominal pain, nausea, belching, bloating or excessive gas:  rest,   eat lightly and use a heating pad.  - Sore Throat: treat with throat lozenges and/or gargle with warm salt   water.  - Because air was used during the procedure, expelling large amounts of air   from your rectum or belching is normal.  - If a bowel prep was taken, you may not have a bowel movement for 1-3 days.    This is normal.  SYMPTOMS TO WATCH FOR AND REPORT TO YOUR PHYSICIAN:  1. Abdominal pain or bloating, other than gas cramps.  2. Chest pain.  3. Back pain.  4. Signs of infection such as: chills or fever occurring within 24 hours   after the procedure.  5. Rectal bleeding, which would show as bright red, maroon, or black stools.   (A tablespoon of blood from the rectum is not serious, especially if   hemorrhoids are present.)  6. Vomiting.  7. Weakness or dizziness.  GO DIRECTLY TO THE NEAREST EMERGENCY ROOM IF YOU HAVE ANY OF THE FOLLOWING:      Difficulty breathing              Chills and/or fever over 101 F   Persistent vomiting and/or vomiting blood   Severe abdominal pain   Severe chest pain   Black, tarry stools   Bleeding- more than one tablespoon   Any  other symptom or condition that you feel may need urgent attention  Your doctor recommends these additional instructions:  If any biopsies were taken, your doctors clinic will contact you in 1 to 2   weeks with any results.  - Discharge patient to home (with escort).   - Resume previous diet.   - Continue present medications.   - Await pathology results.   - The findings and recommendations were discussed with the patient.   - Patient has a contact number available for emergencies.  The signs and   symptoms of potential delayed complications were discussed with the   patient.  Return to normal activities tomorrow.  Written discharge   instructions were provided to the patient.  For questions, problems or results please call your physician - Robyn Butler MD at Work:  [phone].  [facility], EMERGENCY ROOM PHONE NUMBER: [phone]  IF A COMPLICATION OR EMERGENCY SITUATION ARISES AND YOU ARE UNABLE TO REACH   YOUR PHYSICIAN - GO DIRECTLY TO THE EMERGENCY ROOM.  Robyn Butler MD  10/12/2020 1:42:59 PM  This report has been verified and signed electronically.  [employer]

## 2020-10-12 NOTE — TELEPHONE ENCOUNTER
EGD w superficial erosions  PT on ibuprofen and alleve and tylenol for knee pain   Knee replacement scheduled soon   Advised to limit NSAIDS  Will start omeprazole 40mg daily   pls remind pt

## 2020-10-12 NOTE — ANESTHESIA POSTPROCEDURE EVALUATION
Anesthesia Post Evaluation    Patient: Oswaldo Chairez    Procedure(s) Performed: Procedure(s) (LRB):  ESOPHAGOGASTRODUODENOSCOPY (EGD) (N/A)    Final Anesthesia Type: general    Patient location during evaluation: GI PACU  Patient participation: Yes- Able to Participate  Level of consciousness: awake and alert, awake and oriented  Post-procedure vital signs: reviewed and stable  Pain management: adequate  Airway patency: patent    PONV status at discharge: No PONV  Anesthetic complications: no      Cardiovascular status: blood pressure returned to baseline, stable and hemodynamically stable  Respiratory status: unassisted, spontaneous ventilation and room air  Hydration status: euvolemic  Follow-up not needed.          Vitals Value Taken Time   /88 10/12/20 1404   Temp 36.9 °C (98.4 °F) 10/12/20 1334   Pulse 63 10/12/20 1404   Resp 16 10/12/20 1404   SpO2 98 % 10/12/20 1404         Event Time   Out of Recovery 14:13:34         Pain/Joanna Score: Joanna Score: 10 (10/12/2020  2:05 PM)

## 2020-10-13 ENCOUNTER — HOSPITAL ENCOUNTER (OUTPATIENT)
Facility: HOSPITAL | Age: 50
Discharge: HOME | End: 2020-10-13
Attending: INTERNAL MEDICINE | Admitting: INTERNAL MEDICINE
Payer: COMMERCIAL

## 2020-10-13 VITALS
SYSTOLIC BLOOD PRESSURE: 137 MMHG | OXYGEN SATURATION: 99 % | HEIGHT: 72 IN | HEART RATE: 64 BPM | DIASTOLIC BLOOD PRESSURE: 84 MMHG | RESPIRATION RATE: 18 BRPM | TEMPERATURE: 98 F | WEIGHT: 280 LBS | BODY MASS INDEX: 37.93 KG/M2

## 2020-10-13 DIAGNOSIS — R13.10 DYSPHAGIA: ICD-10-CM

## 2020-10-13 PROCEDURE — 25000003 PHARM REV CODE 250: Performed by: INTERNAL MEDICINE

## 2020-10-13 PROCEDURE — 91037 ESOPH IMPED FUNCTION TEST: CPT | Performed by: INTERNAL MEDICINE

## 2020-10-13 PROCEDURE — 91037 PR GERD TST W/ NASAL IMPEDENCE ELECTROD: ICD-10-PCS | Mod: 26,,, | Performed by: INTERNAL MEDICINE

## 2020-10-13 PROCEDURE — 91010 ESOPHAGUS MOTILITY STUDY: CPT | Performed by: INTERNAL MEDICINE

## 2020-10-13 PROCEDURE — 91037 ESOPH IMPED FUNCTION TEST: CPT | Mod: 26,,, | Performed by: INTERNAL MEDICINE

## 2020-10-13 PROCEDURE — 91010 PR ESOPHAGEAL MOTILITY STUDY, MA2METRY: ICD-10-PCS | Mod: 26,,, | Performed by: INTERNAL MEDICINE

## 2020-10-13 PROCEDURE — 91010 ESOPHAGUS MOTILITY STUDY: CPT | Mod: 26,,, | Performed by: INTERNAL MEDICINE

## 2020-10-13 RX ORDER — LIDOCAINE HYDROCHLORIDE 20 MG/ML
JELLY TOPICAL ONCE
Status: COMPLETED | OUTPATIENT
Start: 2020-10-13 | End: 2020-10-13

## 2020-10-13 RX ADMIN — LIDOCAINE HYDROCHLORIDE 10 ML: 20 JELLY TOPICAL at 09:10

## 2020-10-14 LAB
FINAL PATHOLOGIC DIAGNOSIS: NORMAL
GROSS: NORMAL
Lab: NORMAL

## 2020-10-16 ENCOUNTER — TELEPHONE (OUTPATIENT)
Dept: GASTROENTEROLOGY | Facility: CLINIC | Age: 50
End: 2020-10-16

## 2020-10-16 NOTE — TELEPHONE ENCOUNTER
Manometry Results:    High LES pressure with incomplete relaxation  Achalasia -Type II  Incomplete bolus clearance  No significant difference with provocative manuevers  Abnormal multiple rapid swallows test  Residual fluid noted in esophagus throughout and at end of 200cc bolus.     Please let patient know that the manometry shows    Achalasia - Type II      Recommendation:  Follow up with Dr. Butler after all studies completed  - virtual visit   Complete TBS

## 2020-10-19 ENCOUNTER — HOSPITAL ENCOUNTER (OUTPATIENT)
Dept: RADIOLOGY | Facility: HOSPITAL | Age: 50
Discharge: HOME | End: 2020-10-19
Attending: INTERNAL MEDICINE
Payer: COMMERCIAL

## 2020-10-19 DIAGNOSIS — R13.10 DYSPHAGIA, UNSPECIFIED TYPE: ICD-10-CM

## 2020-10-19 PROCEDURE — 25500020 PHARM REV CODE 255: Performed by: INTERNAL MEDICINE

## 2020-10-19 PROCEDURE — 74220 X-RAY XM ESOPHAGUS 1CNTRST: CPT | Mod: TC

## 2020-10-19 PROCEDURE — A9698 NON-RAD CONTRAST MATERIALNOC: HCPCS | Performed by: INTERNAL MEDICINE

## 2020-10-19 PROCEDURE — 74220 X-RAY XM ESOPHAGUS 1CNTRST: CPT | Mod: 26,,, | Performed by: RADIOLOGY

## 2020-10-19 PROCEDURE — 74220 FL ESOPHAGRAM COMPLETE: ICD-10-PCS | Mod: 26,,, | Performed by: RADIOLOGY

## 2020-10-19 RX ADMIN — BARIUM SULFATE 120 ML: 0.81 POWDER, FOR SUSPENSION ORAL at 08:10

## 2020-10-20 ENCOUNTER — TELEPHONE (OUTPATIENT)
Dept: GASTROENTEROLOGY | Facility: CLINIC | Age: 50
End: 2020-10-20

## 2020-10-20 ENCOUNTER — OFFICE VISIT (OUTPATIENT)
Dept: GASTROENTEROLOGY | Facility: CLINIC | Age: 50
End: 2020-10-20
Payer: COMMERCIAL

## 2020-10-20 DIAGNOSIS — R13.10 DYSPHAGIA, UNSPECIFIED TYPE: Primary | ICD-10-CM

## 2020-10-20 DIAGNOSIS — R07.89 NON-CARDIAC CHEST PAIN: ICD-10-CM

## 2020-10-20 DIAGNOSIS — K22.0 ACHALASIA: ICD-10-CM

## 2020-10-20 DIAGNOSIS — K21.9 GASTROESOPHAGEAL REFLUX DISEASE, UNSPECIFIED WHETHER ESOPHAGITIS PRESENT: ICD-10-CM

## 2020-10-20 PROCEDURE — 99214 PR OFFICE/OUTPT VISIT, EST, LEVL IV, 30-39 MIN: ICD-10-PCS | Mod: 95,,, | Performed by: INTERNAL MEDICINE

## 2020-10-20 PROCEDURE — 99214 OFFICE O/P EST MOD 30 MIN: CPT | Mod: 95,,, | Performed by: INTERNAL MEDICINE

## 2020-10-20 RX ORDER — INFLUENZA A VIRUS A/HAWAII/70/2019 (H1N1) RECOMBINANT HEMAGGLUTININ ANTIGEN, INFLUENZA A VIRUS A/MINNESOTA/41/2019 (H3N2) RECOMBINANT HEMAGGLUTININ ANTIGEN, INFLUENZA B VIRUS B/WASHINGTON/02/2019 RECOMBINANT HEMAGGLUTININ ANTIGEN, AND INFLUENZA B VIRUS B/PHUKET/3073/2013 RECOMBINANT HEMAGGLUTININ ANTIGEN 45; 45; 45; 45 UG/.5ML; UG/.5ML; UG/.5ML; UG/.5ML
INJECTION INTRAMUSCULAR
COMMUNITY
Start: 2020-09-22 | End: 2022-11-23

## 2020-10-20 RX ORDER — TRAMADOL HYDROCHLORIDE 50 MG/1
50 TABLET ORAL 2 TIMES DAILY PRN
COMMUNITY
Start: 2020-10-06 | End: 2022-11-23

## 2020-10-20 NOTE — PATIENT INSTRUCTIONS
-Drink high protein shakes three times per day   -Try sildenafil 20mg three times per day as needed with meals for difficulty swallowing   This medication may cause the following side effects.  Discontinue therapy and consult health care provider immediately or go to Emergency Department if you develop any pf the following side effects:   -Significant decreases in blood pressure  -Painful erection for more than 4 hours.  -Vision loss: Sudden loss of vision in one or both eyes, including permanent loss of vision  -Call [facility] and ask them to mail the prescription to you   -See Dr. Cortez to discuss surgical treatment options of achalasia       Patient education: Achalasia (Beyond the Basics)   Author:   Misbah Peck MD   Section :   WALTER Briggs MD   [place] :   Cris Miles MD, GEE, FACG   Contributor Disclosures   All topics are updated as new evidence becomes available and our peer review process is complete.   Literature review current through: May 2018.  This topic last updated: Aug 28, 2017.     ACHALASIA OVERVIEW - Achalasia is an uncommon swallowing disorder that affects about 1 in every 100,000 people. The major symptom of achalasia is usually difficulty with swallowing. Most people are diagnosed between the ages of 25 and 60 years. Although the condition cannot be cured, the symptoms can usually be controlled with treatment.     ACHALASIA CAUSE - In achalasia, nerve cells in the esophagus (the tube that carries food from the mouth to the stomach) degenerate for reasons that are not known. The loss of nerve cells in the esophagus causes two major problems that interfere with swallowing (figure 1):   The muscles that line the esophagus do not contract normally, so that swallowed food is not propelled through the esophagus and into the stomach properly.   The lower esophageal sphincter (LES), a band of muscle that encircles the lower portion of the esophagus, does not  function correctly.     Normally, the LES relaxes when we swallow to allow swallowed food to enter the stomach. When the food has moved through the esophagus into the stomach, the LES muscle contracts to squeeze the end of the esophagus closed, thus preventing the stomach contents from flowing backwards (refluxing) into the esophagus.     In people with achalasia, the LES fails to relax normally with swallowing. Instead, the LES muscle continues to squeeze the end of the esophagus, creating a barrier that prevents food and liquids from passing into the stomach (figure 2). Over time, the esophagus above the persistently contracted LES dilates, and large volumes of food and saliva can accumulate in the dilated esophagus.     ACHALASIA SYMPTOMS - The most common symptom of achalasia is difficulty swallowing. Patients often experience the sensation that swallowed material, both solids and liquids, gets stuck in the chest. This problem often begins slowly and progresses gradually. Many people do not seek help until symptoms are advanced. Some people compensate by eating more slowly and by using maneuvers, such as lifting the neck or throwing the shoulders back, to improve emptying of the esophagus.   Other symptoms can include chest pain, regurgitation of swallowed food and liquid, heartburn, difficulty burping, a sensation of fullness or a lump in the throat, hiccups, and weight loss.     ACHALASIA DIAGNOSIS - Achalasia may be suspected based upon symptoms, but tests are needed to confirm the diagnosis.   Chest x-rays - A chest x-ray may reveal a dilated esophagus and absence of air in the stomach. However, a chest x-ray is not adequate for a diagnosis of achalasia and further testing is required.   Barium swallow test - The barium swallow test is a common screening test for achalasia. The test involves swallowing a chalky-tasting, thick mixture of barium while x-rays are taken. The barium shows the outline of the  "esophagus and lower esophageal sphincter (LES) (figure 2). Characteristic findings of achalasia on barium swallow include a persistently narrowed region at the end of the esophagus (the LES), with a dilated esophagus above the narrowed region. The barium swallow may also show spastic contractions in the esophagus above the LES, a condition called "vigorous achalasia".   Esophageal manometry (also called esophageal motility study) - Manometry is a test that measures changes in pressures within the esophagus that are caused by the contraction of the muscles that line the esophagus. The test involves the passage of a thin tube through the mouth or nose into the esophagus. The tube is lined by numerous pressure sensors that convey pressures within the esophagus to a device that records those pressures. Patients are usually instructed to have nothing to eat or drink for eight hours before the test, and they are given sips of water to swallow while the tube is in place.   Manometry is almost always used to confirm the diagnosis of achalasia. The test typically reveals three abnormalities in people with achalasia: high pressure in the LES at rest, failure of the LES to relax after swallowing, and an absence of useful (peristaltic) contractions in the lower esophagus. The last two features are the most important and are required to make the diagnosis of achalasia.   Endoscopy - Endoscopy allows the physician to see the inside of the esophagus, LES, and stomach using a thin, lighted, flexible tube. Most patients are given sedatives during the endoscopy procedure. This test is usually recommended for people with suspected achalasia and is especially useful for detecting other conditions that can mimic achalasia such as cancer of the upper portion of the stomach. (See "Patient education: Upper endoscopy (Beyond the Basics)".)   In people with achalasia, endoscopy often reveals a dilated esophagus that contains retained food; " "it may also reveal inflammation, small ulcers caused by residual food or pills, and candida (yeast) infection.   The endoscope can be advanced through the LES and into the stomach to check for stomach cancer. Cancer in the upper part of the stomach can produce symptoms and abnormal manometry results that are virtually identical to those of achalasia. This condition is called pseudoachalasia (meaning "false" achalasia) or secondary achalasia. Biopsies (small samples of tissue) are often obtained in the lower portion of the esophagus to look for cancer cells. Having a biopsy of the esophagus taken during endoscopy is not painful and is generally a safe procedure.     ACHALASIA TREATMENT - Several options are available for the treatment of achalasia. Unfortunately, none can stop or reverse the underlying loss of nerve cells in the esophagus of patients with achalasia. However, the treatments are usually effective for improving symptoms.   None of the available treatments are expected to restore normal (peristaltic) contractions in the esophagus of patients with achalasia. Rather, the treatments aim to weaken the lower esophageal sphincter (LES) muscle to the point that it no longer poses a barrier to the passage of food. The LES can be weakened by drugs, or mechanically by procedures that tear or cut the LES muscle.   Drug therapy - Two classes of drugs, nitrates and calcium channel blockers, have LES muscle-relaxing effects. These drugs can decrease symptoms in people with achalasia. The drugs are usually taken by placing a pill under the tongue 10 to 30 minutes before meals.     Drug therapy is the least invasive option for treating achalasia. However, most people find that long-term drug therapy is inconvenient, ineffective, and often associated with unpleasant side effects, such as headache and low blood pressure. Furthermore, the drugs tend to become less effective over time. For these reasons, medications are " recommended primarily for patients who are not interested in or not healthy enough for mechanical treatments such as balloon dilation and surgery (myotomy).     Balloon dilation (pneumatic dilatation) - For balloon dilation, the patient swallows a collapsed balloon that is positioned in the LES. An x-ray machine is often used to guide placement of the balloon. When the balloon has been positioned at the LES, it is inflated abruptly to a large size in order to tear the muscle of the LES. This procedure is effective for relieving the swallowing difficulty in patients with achalasia in approximately two-thirds of people, although chest pain persists in some. Patients frequently require more than one balloon dilation treatment for adequate relief.   Procedure - If you have balloon dilation, you will be asked to drink only liquids for 12 hours to two days in advance (a longer period is recommended if you have a great deal of food retention in the esophagus). Using endoscopy and fluoroscopy (x-ray), a physician advances a guide wire down the esophagus and positions it inside the LES. A deflated balloon is then advanced along this guide wire, positioned inside the LES, and inflated for a variable period ranging from seconds to minutes. The balloon is then deflated and withdrawn, and you are monitored in a recovery area for a number of hours to detect any complications. After the balloon dilation, some physicians routinely perform an x-ray test similar to the barium swallow described above to make sure that the balloon has not created a hole (perforation) in the esophagus. If there are no complications, you can usually resume eating when you have recovered from the procedure. If your day-to-day symptoms do not improve, additional   dilations can be performed.   Success rate - A single balloon dilation session continues to relieve symptoms of achalasia in about 60 percent of people one year after the procedure and in about  25 percent of people five years after the procedure. Higher success rates have been reported in some studies. The success rate after longer periods has not been well studied, but some people have remained symptom-free for as long as 25 years.   Complications - About 15 percent of people experience severe chest pain immediately after balloon dilation and some experience fever.   The most serious complication of balloon dilation is creation of a hole (perforation) in the wall of the esophagus; this complication occurs in about 2 to 5 percent of people undergoing the procedure. Symptoms of persistent or worsening pain in the hours after the procedure may indicate a perforation.     Perforations of the esophagus after balloon dilation are usually small. For the treatment of small perforations, your doctor will probably admit you to the hospital for intravenous feeding and antibiotic treatment. This usually results in healing of the perforation within one week without surgery. Large perforations usually require emergency surgery for repair. In some cases, your doctor might recommend surgery even for a small perforation. Another option for treatment of small perforations is the placement of an esophageal stent, which is a short plastic tube that is positioned in the esophagus to seal the perforation. The stent prevents swallowed material from entering the perforation, which enables the perforation to heal, but allows swallowed material to pass through the stent into the stomach. A key factor in the successful treatment of perforation of the esophagus is rapid identification of the perforation and rapid implementation of treatment. You should call your physician   immediately if you experience increasing pain after balloon dilation, especially if you develop a fever or chills.   Bleeding is another important, but infrequent complication of balloon dilation. This complication usually occurs immediately after the dilation.  "Symptoms of bleeding include dizziness or fainting, especially on standing up, vomiting of blood or material that looks like coffee grounds, and the passage of black or bloody stools. You should notify your doctor immediately if you experience these symptoms.   Patients also can develop gastroesophageal reflux disease (GERD) after balloon dilation. Because the LES is the principal barrier that prevents stomach contents from refluxing (backwashing) into the esophagus, LES disruption by balloon dilation can lead to acid reflux. GERD occurs in about 2 percent of people after balloon dilation, but is usually easily controlled with acid-reducing medications. (See "Patient education: Acid reflux (gastroesophageal reflux disease) in adults (Beyond the Basics)".)     Surgery (myotomy) - Myotomy is an operation that is used to weaken the LES by cutting its muscle fibers. The most common surgical technique used to treat achalasia is called the Heller myotomy, in which the surgeon cuts the muscles at the end of the esophagus and at the top of the stomach. In the past, this surgery was performed through a large (open) incision in the chest or abdomen. Today, this surgery is usually performed laparoscopically, using instruments and a television camera that are passed into the abdomen through small abdominal incisions. People who undergo laparoscopic myotomy are given general anesthesia, and generally stay in the hospital for one to two days.   Success rate - Surgery relieves symptoms in 70 to 90 percent of people. Symptom relief is sustained in about 85 percent of people 10 years after surgery and in about 65 percent of people 20 years after the surgery. Thus, some consider surgery to be a more definitive treatment for achalasia than balloon dilation or botulinum toxin injection (see below).   Complications - Postoperative pain is expected, and is treated with pain medications. Like balloon dilation, there is a risk of acid " "reflux following myotomy, which can cause damage to the esophagus over time.   During the operation for myotomy, surgeons often perform an additional procedure called a fundoplication in which a portion of the stomach is wrapped around the esophagus to prevent the reflux of stomach contents (figure 3). However, the fundoplication does not always prevent reflux, and it can cause additional complications such as difficulty swallowing, bloating, flatulence, and diarrhea. (See "Patient education: Acid reflux (gastroesophageal reflux disease) in adults (Beyond the Basics)".)     Peroral endoscopy myotomy - Peroral endoscopic myotomy (POEM) is a newer endoscopic technique for performing myotomy of the LES. In POEM, the endoscopist passes an electrical scalpel through the endoscope to make an incision in the lining of the esophagus and to create a tunnel within the wall of the esophagus (between the inner lining of the esophagus and the outer muscle layer of the esophagus). The endoscope is advanced into that tunnel, and the muscle of the esophagus can be cut using an electrical scalpel device that is passed through the endoscope. Early studies suggest that POEM can achieve results comparable to or even better than those of pneumatic dilation and surgical myotomy, and with similar safety. However, POEM is a newer procedure, and little is known of its long-term outcome. Since POEM is not routinely combined with a procedure to prevent the reflux of gastric contents, problematic GERD can occur after POEM. Presently, POEM is being performed in a limited number of   centers by a small number of endoscopists, and the procedure may not be widely available. Differences in the experience and skill of the endoscopists who perform POEM also might have considerable influence on the outcome of the procedure.     Botulinum toxin injection - Botulinum toxin injections temporarily paralyze the nerves that signal the LES to contract, " "thereby helping to relieve the obstruction. Botulinum toxin injection also is used occasionally as a diagnostic test for people who appear to have achalasia but who have inconclusive test results.   Procedure - The injection procedure is performed during endoscopy, while the patient is sedated. The botulinum toxin is injected through the lining of the esophagus directly into the LES muscle.   Success rate - A single botulinum toxin injection session relieves symptoms in 65 to 90 percent of people in the short term (three months to approximately one year). Additional injections can relieve symptoms in patients whose symptoms return. Botulinum toxin injection is more likely to be effective in people over the age of 50 years and in people who have the vigorous form of achalasia.   When compared with balloon dilation, botulinum toxin has a similar effectiveness for relieving symptoms in the first one to two years after the procedure; however, prolonged effectiveness requires multiple botulinum toxin injections because the paralyzing effect of the toxin is temporary. The long-term safety and effectiveness of botulinum toxin injection are unknown.   Complications - About 25 percent of people have chest pain for a few hours after the procedure and about 5 percent develop heartburn. Damage to the esophageal wall and lining are rare. The short-term safety of botulinum toxin injection is greater than the short-term safety of both balloon dilation and surgery; this greater short-term safety may make botulinum toxin injection a better choice for patients with other serious medical conditions (eg, advanced age, severe heart or lung problems) who cannot tolerate a balloon dilation or myotomy.     LONG-TERM RISK OF ESOPHAGEAL CANCER - People with achalasia have an increased risk of developing esophageal cancer. Your doctor might recommend endoscopy for early detection of this cancer. (See "Patient education: Upper endoscopy " "(Beyond the Basics)".)     ACHALASIA FOLLOW-UP - Since none of the treatments for achalasia cure the underlying disease, regular follow-up is needed. The goal is to recognize and treat recurrent symptoms or complications of treatment (eg, acid reflux) early. Recognizing and treating these problems can help to prevent the development of severe enlargement of the esophagus (rich-esophagus) and cancer, which could require surgical removal of the entire esophagus.      "
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
## 2020-10-21 ENCOUNTER — TELEPHONE (OUTPATIENT)
Dept: GASTROENTEROLOGY | Facility: CLINIC | Age: 50
End: 2020-10-21

## 2020-10-21 NOTE — TELEPHONE ENCOUNTER
Pt notified apt on 12/7 cancelled since his case was discussed earlier. After visit summary mailed to pt.

## 2020-11-16 ENCOUNTER — OFFICE VISIT (OUTPATIENT)
Dept: SURGERY | Facility: CLINIC | Age: 50
End: 2020-11-16
Payer: COMMERCIAL

## 2020-11-16 VITALS
DIASTOLIC BLOOD PRESSURE: 94 MMHG | BODY MASS INDEX: 37.99 KG/M2 | HEART RATE: 73 BPM | WEIGHT: 280.44 LBS | HEIGHT: 72 IN | SYSTOLIC BLOOD PRESSURE: 141 MMHG

## 2020-11-16 DIAGNOSIS — K22.0 ACHALASIA: Primary | ICD-10-CM

## 2020-11-16 PROCEDURE — 1126F PR PAIN SEVERITY QUANTIFIED, NO PAIN PRESENT: ICD-10-PCS | Mod: S$GLB,,, | Performed by: SURGERY

## 2020-11-16 PROCEDURE — 99999 PR PBB SHADOW E&M-EST. PATIENT-LVL III: CPT | Mod: PBBFAC,,, | Performed by: SURGERY

## 2020-11-16 PROCEDURE — 99204 PR OFFICE/OUTPT VISIT, NEW, LEVL IV, 45-59 MIN: ICD-10-PCS | Mod: S$GLB,,, | Performed by: SURGERY

## 2020-11-16 PROCEDURE — 1126F AMNT PAIN NOTED NONE PRSNT: CPT | Mod: S$GLB,,, | Performed by: SURGERY

## 2020-11-16 PROCEDURE — 99204 OFFICE O/P NEW MOD 45 MIN: CPT | Mod: S$GLB,,, | Performed by: SURGERY

## 2020-11-16 PROCEDURE — 99999 PR PBB SHADOW E&M-EST. PATIENT-LVL III: ICD-10-PCS | Mod: PBBFAC,,, | Performed by: SURGERY

## 2020-11-16 PROCEDURE — 3008F PR BODY MASS INDEX (BMI) DOCUMENTED: ICD-10-PCS | Mod: CPTII,S$GLB,, | Performed by: SURGERY

## 2020-11-16 PROCEDURE — 3008F BODY MASS INDEX DOCD: CPT | Mod: CPTII,S$GLB,, | Performed by: SURGERY

## 2020-11-16 RX ORDER — ACETAMINOPHEN 325 MG/1
TABLET ORAL
COMMUNITY
Start: 2020-10-27 | End: 2022-11-23

## 2020-11-16 RX ORDER — ASPIRIN 81 MG/1
TABLET ORAL
COMMUNITY
Start: 2020-10-27 | End: 2021-07-14 | Stop reason: CLARIF

## 2020-11-16 RX ORDER — DOXAZOSIN 2 MG/1
2 TABLET ORAL DAILY
COMMUNITY
Start: 2020-09-29

## 2020-11-16 NOTE — LETTER
November 16, 2020      Robyn Butler MD  [address]  [facility] [address]           [facility]  [address]  [facility] [address]  Phone: [phone]          Patient: Oswaldo Chairez   MR Number: [id number]   YOB: 1970   Date of Visit: 11/16/2020       Dear Dr. Robyn Butler:    Thank you for referring Oswaldo Chairez to me for evaluation. Attached you will find relevant portions of my assessment and plan of care.    If you have questions, please do not hesitate to call me. I look forward to following Oswaldo Chairez along with you.    Sincerely,    Ubaldo Cortez Jr., MD    Enclosure  CC:  No Recipients    If you would like to receive this communication electronically, please contact [email or web address] or [phone] to request more information on [employer] access.    For providers and/or their staff who would like to refer a patient to [facility], please contact us through our one-stop-shop provider referral line, [facility], at [phone].    If you feel you have received this communication in error or would no longer like to receive these types of communications, please e-mail [email or web address]
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
## 2020-11-17 ENCOUNTER — TELEPHONE (OUTPATIENT)
Dept: GASTROENTEROLOGY | Facility: CLINIC | Age: 50
End: 2020-11-17

## 2020-11-17 NOTE — TELEPHONE ENCOUNTER
----- Message from Robyn Butler MD sent at 11/17/2020  1:12 PM CST -----  Pls add to surgery list   ----- Message -----  From: Ubaldo Cortez Jr., MD  Sent: 11/16/2020   4:21 PM CST  To: Robyn Butler MD

## 2020-12-29 ENCOUNTER — TELEPHONE (OUTPATIENT)
Dept: SURGERY | Facility: CLINIC | Age: 50
End: 2020-12-29

## 2020-12-29 NOTE — TELEPHONE ENCOUNTER
----- Message from Myrna Rodriguez sent at 12/29/2020  8:39 AM CST -----  Contact: Patient Called [phone]  Patient is requesting a call back.  Need clarity on what procedure he will be having done on 1/5/21 and why is he having it done.  States that he was unaware until he received notice.      Communication:  [phone]

## 2020-12-29 NOTE — TELEPHONE ENCOUNTER
Returned pt phone call in regards to his upcoming surgery.   Pt states he may have to put this on hold d/t work.  He would let us know something by the end of this week.

## 2021-01-04 ENCOUNTER — TELEPHONE (OUTPATIENT)
Dept: SURGERY | Facility: CLINIC | Age: 51
End: 2021-01-04

## 2021-01-07 ENCOUNTER — PATIENT MESSAGE (OUTPATIENT)
Dept: GASTROENTEROLOGY | Facility: CLINIC | Age: 51
End: 2021-01-07

## 2021-01-07 ENCOUNTER — TELEPHONE (OUTPATIENT)
Dept: GASTROENTEROLOGY | Facility: CLINIC | Age: 51
End: 2021-01-07

## 2021-01-12 ENCOUNTER — PATIENT MESSAGE (OUTPATIENT)
Dept: GASTROENTEROLOGY | Facility: CLINIC | Age: 51
End: 2021-01-12

## 2021-01-12 DIAGNOSIS — K22.0 ACHALASIA: Primary | ICD-10-CM

## 2021-01-14 ENCOUNTER — PATIENT MESSAGE (OUTPATIENT)
Dept: GASTROENTEROLOGY | Facility: CLINIC | Age: 51
End: 2021-01-14

## 2021-01-14 RX ORDER — SILDENAFIL CITRATE 20 MG/1
20 TABLET ORAL 3 TIMES DAILY
Qty: 90 TABLET | Refills: 11 | Status: SHIPPED | OUTPATIENT
Start: 2021-01-14 | End: 2022-11-28

## 2021-03-22 ENCOUNTER — TELEPHONE (OUTPATIENT)
Dept: GASTROENTEROLOGY | Facility: CLINIC | Age: 51
End: 2021-03-22

## 2021-06-09 ENCOUNTER — LAB VISIT (OUTPATIENT)
Dept: LAB | Facility: HOSPITAL | Age: 51
End: 2021-06-09
Attending: SURGERY
Payer: COMMERCIAL

## 2021-06-09 ENCOUNTER — OFFICE VISIT (OUTPATIENT)
Dept: SURGERY | Facility: CLINIC | Age: 51
End: 2021-06-09
Payer: COMMERCIAL

## 2021-06-09 VITALS
BODY MASS INDEX: 36.93 KG/M2 | HEIGHT: 72 IN | DIASTOLIC BLOOD PRESSURE: 77 MMHG | HEART RATE: 72 BPM | SYSTOLIC BLOOD PRESSURE: 128 MMHG | WEIGHT: 272.69 LBS

## 2021-06-09 DIAGNOSIS — Z01.818 PREOP TESTING: ICD-10-CM

## 2021-06-09 DIAGNOSIS — K22.0 ACHALASIA: Primary | ICD-10-CM

## 2021-06-09 LAB
ANION GAP SERPL CALC-SCNC: 8 MMOL/L (ref 8–16)
BASOPHILS # BLD AUTO: 0.06 K/UL (ref 0–0.2)
BASOPHILS NFR BLD: 0.9 % (ref 0–1.9)
BUN SERPL-MCNC: 12 MG/DL (ref 6–20)
CALCIUM SERPL-MCNC: 9.2 MG/DL (ref 8.7–10.5)
CHLORIDE SERPL-SCNC: 109 MMOL/L (ref 95–110)
CO2 SERPL-SCNC: 23 MMOL/L (ref 23–29)
CREAT SERPL-MCNC: 0.9 MG/DL (ref 0.5–1.4)
DIFFERENTIAL METHOD: ABNORMAL
EOSINOPHIL # BLD AUTO: 0.1 K/UL (ref 0–0.5)
EOSINOPHIL NFR BLD: 1.4 % (ref 0–8)
ERYTHROCYTE [DISTWIDTH] IN BLOOD BY AUTOMATED COUNT: 12.9 % (ref 11.5–14.5)
EST. GFR  (AFRICAN AMERICAN): >60 ML/MIN/1.73 M^2
EST. GFR  (NON AFRICAN AMERICAN): >60 ML/MIN/1.73 M^2
GLUCOSE SERPL-MCNC: 102 MG/DL (ref 70–110)
HCT VFR BLD AUTO: 37.4 % (ref 40–54)
HGB BLD-MCNC: 12.5 G/DL (ref 14–18)
IMM GRANULOCYTES # BLD AUTO: 0.02 K/UL (ref 0–0.04)
IMM GRANULOCYTES NFR BLD AUTO: 0.3 % (ref 0–0.5)
LYMPHOCYTES # BLD AUTO: 0.8 K/UL (ref 1–4.8)
LYMPHOCYTES NFR BLD: 12.5 % (ref 18–48)
MCH RBC QN AUTO: 29.7 PG (ref 27–31)
MCHC RBC AUTO-ENTMCNC: 33.4 G/DL (ref 32–36)
MCV RBC AUTO: 89 FL (ref 82–98)
MONOCYTES # BLD AUTO: 0.5 K/UL (ref 0.3–1)
MONOCYTES NFR BLD: 6.9 % (ref 4–15)
NEUTROPHILS # BLD AUTO: 5.2 K/UL (ref 1.8–7.7)
NEUTROPHILS NFR BLD: 78 % (ref 38–73)
NRBC BLD-RTO: 0 /100 WBC
PLATELET # BLD AUTO: 223 K/UL (ref 150–450)
PMV BLD AUTO: 10.4 FL (ref 9.2–12.9)
POTASSIUM SERPL-SCNC: 4.6 MMOL/L (ref 3.5–5.1)
RBC # BLD AUTO: 4.21 M/UL (ref 4.6–6.2)
SODIUM SERPL-SCNC: 140 MMOL/L (ref 136–145)
WBC # BLD AUTO: 6.63 K/UL (ref 3.9–12.7)

## 2021-06-09 PROCEDURE — 99999 PR PBB SHADOW E&M-EST. PATIENT-LVL IV: ICD-10-PCS | Mod: PBBFAC,,, | Performed by: SURGERY

## 2021-06-09 PROCEDURE — 1126F AMNT PAIN NOTED NONE PRSNT: CPT | Mod: S$GLB,,, | Performed by: SURGERY

## 2021-06-09 PROCEDURE — 85025 COMPLETE CBC W/AUTO DIFF WBC: CPT | Performed by: SURGERY

## 2021-06-09 PROCEDURE — 36415 COLL VENOUS BLD VENIPUNCTURE: CPT | Performed by: SURGERY

## 2021-06-09 PROCEDURE — 3008F BODY MASS INDEX DOCD: CPT | Mod: CPTII,S$GLB,, | Performed by: SURGERY

## 2021-06-09 PROCEDURE — 99999 PR PBB SHADOW E&M-EST. PATIENT-LVL IV: CPT | Mod: PBBFAC,,, | Performed by: SURGERY

## 2021-06-09 PROCEDURE — 99213 OFFICE O/P EST LOW 20 MIN: CPT | Mod: S$GLB,,, | Performed by: SURGERY

## 2021-06-09 PROCEDURE — 99213 PR OFFICE/OUTPT VISIT, EST, LEVL III, 20-29 MIN: ICD-10-PCS | Mod: S$GLB,,, | Performed by: SURGERY

## 2021-06-09 PROCEDURE — 3008F PR BODY MASS INDEX (BMI) DOCUMENTED: ICD-10-PCS | Mod: CPTII,S$GLB,, | Performed by: SURGERY

## 2021-06-09 PROCEDURE — 80048 BASIC METABOLIC PNL TOTAL CA: CPT | Performed by: SURGERY

## 2021-06-09 PROCEDURE — 1126F PR PAIN SEVERITY QUANTIFIED, NO PAIN PRESENT: ICD-10-PCS | Mod: S$GLB,,, | Performed by: SURGERY

## 2021-06-09 RX ORDER — CEFAZOLIN SODIUM 2 G/50ML
2 SOLUTION INTRAVENOUS
Status: CANCELLED | OUTPATIENT
Start: 2021-06-09

## 2021-06-15 ENCOUNTER — TELEPHONE (OUTPATIENT)
Dept: SURGERY | Facility: CLINIC | Age: 51
End: 2021-06-15

## 2021-07-14 ENCOUNTER — TELEPHONE (OUTPATIENT)
Dept: SURGERY | Facility: CLINIC | Age: 51
End: 2021-07-14

## 2021-07-14 ENCOUNTER — ANESTHESIA EVENT (OUTPATIENT)
Dept: SURGERY | Facility: HOSPITAL | Age: 51
End: 2021-07-14
Payer: COMMERCIAL

## 2021-07-15 ENCOUNTER — HOSPITAL ENCOUNTER (OUTPATIENT)
Facility: HOSPITAL | Age: 51
Discharge: HOME | End: 2021-07-17
Attending: SURGERY | Admitting: SURGERY
Payer: COMMERCIAL

## 2021-07-15 ENCOUNTER — ANESTHESIA (OUTPATIENT)
Dept: SURGERY | Facility: HOSPITAL | Age: 51
End: 2021-07-15
Payer: COMMERCIAL

## 2021-07-15 DIAGNOSIS — K22.0 ACHALASIA: ICD-10-CM

## 2021-07-15 PROCEDURE — 25000003 PHARM REV CODE 250

## 2021-07-15 PROCEDURE — 25000003 PHARM REV CODE 250: Performed by: NURSE ANESTHETIST, CERTIFIED REGISTERED

## 2021-07-15 PROCEDURE — 71000016 HC POSTOP RECOV ADDL HR: Performed by: SURGERY

## 2021-07-15 PROCEDURE — 43279 PR LAP, ESOPHAGOMYOTOMY W FUNDOPLASTY: ICD-10-PCS | Mod: ,,, | Performed by: SURGERY

## 2021-07-15 PROCEDURE — 63600175 PHARM REV CODE 636 W HCPCS: Performed by: NURSE ANESTHETIST, CERTIFIED REGISTERED

## 2021-07-15 PROCEDURE — 25000242 PHARM REV CODE 250 ALT 637 W/ HCPCS

## 2021-07-15 PROCEDURE — 94761 N-INVAS EAR/PLS OXIMETRY MLT: CPT

## 2021-07-15 PROCEDURE — 71000033 HC RECOVERY, INTIAL HOUR: Performed by: SURGERY

## 2021-07-15 PROCEDURE — 36000710: Performed by: SURGERY

## 2021-07-15 PROCEDURE — 63600175 PHARM REV CODE 636 W HCPCS: Performed by: STUDENT IN AN ORGANIZED HEALTH CARE EDUCATION/TRAINING PROGRAM

## 2021-07-15 PROCEDURE — 11000001 HC ACUTE MED/SURG PRIVATE ROOM

## 2021-07-15 PROCEDURE — 25000003 PHARM REV CODE 250: Performed by: SURGERY

## 2021-07-15 PROCEDURE — 43279 LAP MYOTOMY HELLER: CPT | Mod: ,,, | Performed by: SURGERY

## 2021-07-15 PROCEDURE — D9220A PRA ANESTHESIA: ICD-10-PCS | Mod: ,,, | Performed by: ANESTHESIOLOGY

## 2021-07-15 PROCEDURE — 63600175 PHARM REV CODE 636 W HCPCS

## 2021-07-15 PROCEDURE — 37000008 HC ANESTHESIA 1ST 15 MINUTES: Performed by: SURGERY

## 2021-07-15 PROCEDURE — 25000003 PHARM REV CODE 250: Performed by: STUDENT IN AN ORGANIZED HEALTH CARE EDUCATION/TRAINING PROGRAM

## 2021-07-15 PROCEDURE — 27201423 OPTIME MED/SURG SUP & DEVICES STERILE SUPPLY: Performed by: SURGERY

## 2021-07-15 PROCEDURE — D9220A PRA ANESTHESIA: Mod: ,,, | Performed by: ANESTHESIOLOGY

## 2021-07-15 PROCEDURE — 71000015 HC POSTOP RECOV 1ST HR: Performed by: SURGERY

## 2021-07-15 PROCEDURE — 36000711: Performed by: SURGERY

## 2021-07-15 PROCEDURE — 37000009 HC ANESTHESIA EA ADD 15 MINS: Performed by: SURGERY

## 2021-07-15 PROCEDURE — 63600175 PHARM REV CODE 636 W HCPCS: Performed by: ANESTHESIOLOGY

## 2021-07-15 RX ORDER — BUPIVACAINE HYDROCHLORIDE 2.5 MG/ML
INJECTION, SOLUTION EPIDURAL; INFILTRATION; INTRACAUDAL
Status: DISCONTINUED | OUTPATIENT
Start: 2021-07-15 | End: 2021-07-15 | Stop reason: HOSPADM

## 2021-07-15 RX ORDER — DEXMEDETOMIDINE HYDROCHLORIDE 100 UG/ML
INJECTION, SOLUTION INTRAVENOUS
Status: DISCONTINUED | OUTPATIENT
Start: 2021-07-15 | End: 2021-07-15

## 2021-07-15 RX ORDER — PHENYLEPHRINE HCL IN 0.9% NACL 1 MG/10 ML
SYRINGE (ML) INTRAVENOUS
Status: DISCONTINUED | OUTPATIENT
Start: 2021-07-15 | End: 2021-07-15

## 2021-07-15 RX ORDER — AMITRIPTYLINE HYDROCHLORIDE 25 MG/1
25 TABLET, FILM COATED ORAL NIGHTLY
Status: DISCONTINUED | OUTPATIENT
Start: 2021-07-15 | End: 2021-07-17 | Stop reason: HOSPADM

## 2021-07-15 RX ORDER — GABAPENTIN 250 MG/5ML
125 SOLUTION ORAL EVERY 8 HOURS
Status: DISCONTINUED | OUTPATIENT
Start: 2021-07-15 | End: 2021-07-17 | Stop reason: HOSPADM

## 2021-07-15 RX ORDER — NALOXONE HCL 0.4 MG/ML
0.02 VIAL (ML) INJECTION
Status: DISCONTINUED | OUTPATIENT
Start: 2021-07-15 | End: 2021-07-17 | Stop reason: HOSPADM

## 2021-07-15 RX ORDER — SODIUM CHLORIDE 0.9 % (FLUSH) 0.9 %
10 SYRINGE (ML) INJECTION
Status: DISCONTINUED | OUTPATIENT
Start: 2021-07-15 | End: 2021-07-17 | Stop reason: HOSPADM

## 2021-07-15 RX ORDER — PROPOFOL 10 MG/ML
VIAL (ML) INTRAVENOUS
Status: DISCONTINUED | OUTPATIENT
Start: 2021-07-15 | End: 2021-07-15

## 2021-07-15 RX ORDER — ONDANSETRON 2 MG/ML
4 INJECTION INTRAMUSCULAR; INTRAVENOUS DAILY PRN
Status: DISCONTINUED | OUTPATIENT
Start: 2021-07-15 | End: 2021-07-15 | Stop reason: HOSPADM

## 2021-07-15 RX ORDER — FENTANYL CITRATE 50 UG/ML
INJECTION, SOLUTION INTRAMUSCULAR; INTRAVENOUS
Status: DISCONTINUED | OUTPATIENT
Start: 2021-07-15 | End: 2021-07-15

## 2021-07-15 RX ORDER — OXYCODONE HCL 5 MG/5 ML
5 SOLUTION, ORAL ORAL EVERY 4 HOURS PRN
Status: DISCONTINUED | OUTPATIENT
Start: 2021-07-15 | End: 2021-07-17 | Stop reason: HOSPADM

## 2021-07-15 RX ORDER — MUPIROCIN 20 MG/G
OINTMENT TOPICAL 2 TIMES DAILY
Status: DISCONTINUED | OUTPATIENT
Start: 2021-07-15 | End: 2021-07-17 | Stop reason: HOSPADM

## 2021-07-15 RX ORDER — CEFAZOLIN SODIUM 1 G/3ML
2 INJECTION, POWDER, FOR SOLUTION INTRAMUSCULAR; INTRAVENOUS
Status: DISCONTINUED | OUTPATIENT
Start: 2021-07-15 | End: 2021-07-15

## 2021-07-15 RX ORDER — ROCURONIUM BROMIDE 10 MG/ML
INJECTION, SOLUTION INTRAVENOUS
Status: DISCONTINUED | OUTPATIENT
Start: 2021-07-15 | End: 2021-07-15

## 2021-07-15 RX ORDER — ONDANSETRON 2 MG/ML
8 INJECTION INTRAMUSCULAR; INTRAVENOUS EVERY 6 HOURS PRN
Status: DISCONTINUED | OUTPATIENT
Start: 2021-07-15 | End: 2021-07-17 | Stop reason: HOSPADM

## 2021-07-15 RX ORDER — CEFAZOLIN SODIUM 1 G/3ML
INJECTION, POWDER, FOR SOLUTION INTRAMUSCULAR; INTRAVENOUS
Status: DISCONTINUED | OUTPATIENT
Start: 2021-07-15 | End: 2021-07-15

## 2021-07-15 RX ORDER — ONDANSETRON 2 MG/ML
INJECTION INTRAMUSCULAR; INTRAVENOUS
Status: DISCONTINUED | OUTPATIENT
Start: 2021-07-15 | End: 2021-07-15

## 2021-07-15 RX ORDER — MIDAZOLAM HYDROCHLORIDE 1 MG/ML
INJECTION, SOLUTION INTRAMUSCULAR; INTRAVENOUS
Status: DISCONTINUED | OUTPATIENT
Start: 2021-07-15 | End: 2021-07-15

## 2021-07-15 RX ORDER — ACETAMINOPHEN 10 MG/ML
1000 INJECTION, SOLUTION INTRAVENOUS EVERY 8 HOURS
Status: COMPLETED | OUTPATIENT
Start: 2021-07-15 | End: 2021-07-16

## 2021-07-15 RX ORDER — EPHEDRINE SULFATE 50 MG/ML
INJECTION, SOLUTION INTRAVENOUS
Status: DISCONTINUED | OUTPATIENT
Start: 2021-07-15 | End: 2021-07-15

## 2021-07-15 RX ORDER — ACETAMINOPHEN 325 MG/1
650 TABLET ORAL EVERY 4 HOURS PRN
Status: DISCONTINUED | OUTPATIENT
Start: 2021-07-15 | End: 2021-07-17 | Stop reason: HOSPADM

## 2021-07-15 RX ORDER — NEOSTIGMINE METHYLSULFATE 0.5 MG/ML
INJECTION, SOLUTION INTRAVENOUS
Status: DISCONTINUED | OUTPATIENT
Start: 2021-07-15 | End: 2021-07-15

## 2021-07-15 RX ORDER — LIDOCAINE HYDROCHLORIDE AND EPINEPHRINE 10; 10 MG/ML; UG/ML
INJECTION, SOLUTION INFILTRATION; PERINEURAL
Status: DISCONTINUED | OUTPATIENT
Start: 2021-07-15 | End: 2021-07-15 | Stop reason: HOSPADM

## 2021-07-15 RX ORDER — SODIUM CHLORIDE, SODIUM LACTATE, POTASSIUM CHLORIDE, CALCIUM CHLORIDE 600; 310; 30; 20 MG/100ML; MG/100ML; MG/100ML; MG/100ML
INJECTION, SOLUTION INTRAVENOUS CONTINUOUS
Status: DISCONTINUED | OUTPATIENT
Start: 2021-07-15 | End: 2021-07-16

## 2021-07-15 RX ORDER — PROCHLORPERAZINE EDISYLATE 5 MG/ML
5 INJECTION INTRAMUSCULAR; INTRAVENOUS EVERY 6 HOURS PRN
Status: DISCONTINUED | OUTPATIENT
Start: 2021-07-15 | End: 2021-07-17 | Stop reason: HOSPADM

## 2021-07-15 RX ORDER — FENTANYL CITRATE 50 UG/ML
25 INJECTION, SOLUTION INTRAMUSCULAR; INTRAVENOUS EVERY 5 MIN PRN
Status: DISCONTINUED | OUTPATIENT
Start: 2021-07-15 | End: 2021-07-15 | Stop reason: HOSPADM

## 2021-07-15 RX ORDER — FENTANYL CITRATE 50 UG/ML
50 INJECTION, SOLUTION INTRAMUSCULAR; INTRAVENOUS ONCE
Status: COMPLETED | OUTPATIENT
Start: 2021-07-15 | End: 2021-07-15

## 2021-07-15 RX ORDER — LIDOCAINE HYDROCHLORIDE 20 MG/ML
INJECTION INTRAVENOUS
Status: DISCONTINUED | OUTPATIENT
Start: 2021-07-15 | End: 2021-07-15

## 2021-07-15 RX ORDER — SUCCINYLCHOLINE CHLORIDE 20 MG/ML
INJECTION INTRAMUSCULAR; INTRAVENOUS
Status: DISCONTINUED | OUTPATIENT
Start: 2021-07-15 | End: 2021-07-15

## 2021-07-15 RX ORDER — ACETAMINOPHEN 10 MG/ML
INJECTION, SOLUTION INTRAVENOUS
Status: DISCONTINUED | OUTPATIENT
Start: 2021-07-15 | End: 2021-07-15

## 2021-07-15 RX ORDER — OXYCODONE HCL 5 MG/5 ML
10 SOLUTION, ORAL ORAL EVERY 4 HOURS PRN
Status: DISCONTINUED | OUTPATIENT
Start: 2021-07-15 | End: 2021-07-17 | Stop reason: HOSPADM

## 2021-07-15 RX ADMIN — NEOSTIGMINE METHYLSULFATE 5 MG: 0.5 INJECTION INTRAVENOUS at 04:07

## 2021-07-15 RX ADMIN — FENTANYL CITRATE 50 MCG: 50 INJECTION INTRAMUSCULAR; INTRAVENOUS at 02:07

## 2021-07-15 RX ADMIN — METHOCARBAMOL 500 MG: 100 INJECTION, SOLUTION INTRAMUSCULAR; INTRAVENOUS at 10:07

## 2021-07-15 RX ADMIN — OXYCODONE HYDROCHLORIDE 10 MG: 5 SOLUTION ORAL at 10:07

## 2021-07-15 RX ADMIN — EPHEDRINE SULFATE 10 MG: 50 INJECTION INTRAVENOUS at 03:07

## 2021-07-15 RX ADMIN — FENTANYL CITRATE 100 MCG: 50 INJECTION, SOLUTION INTRAMUSCULAR; INTRAVENOUS at 01:07

## 2021-07-15 RX ADMIN — SODIUM CHLORIDE 3 G: 9 INJECTION, SOLUTION INTRAVENOUS at 05:07

## 2021-07-15 RX ADMIN — ROCURONIUM BROMIDE 10 MG: 10 INJECTION, SOLUTION INTRAVENOUS at 01:07

## 2021-07-15 RX ADMIN — ROCURONIUM BROMIDE 30 MG: 10 INJECTION, SOLUTION INTRAVENOUS at 01:07

## 2021-07-15 RX ADMIN — MUPIROCIN: 20 OINTMENT TOPICAL at 09:07

## 2021-07-15 RX ADMIN — ONDANSETRON 4 MG: 2 INJECTION INTRAMUSCULAR; INTRAVENOUS at 04:07

## 2021-07-15 RX ADMIN — FENTANYL CITRATE 50 MCG: 50 INJECTION, SOLUTION INTRAMUSCULAR; INTRAVENOUS at 04:07

## 2021-07-15 RX ADMIN — GLYCOPYRROLATE 0.6 MG: 0.2 INJECTION, SOLUTION INTRAMUSCULAR; INTRAVITREAL at 04:07

## 2021-07-15 RX ADMIN — FENTANYL CITRATE 50 MCG: 50 INJECTION, SOLUTION INTRAMUSCULAR; INTRAVENOUS at 05:07

## 2021-07-15 RX ADMIN — SODIUM CHLORIDE, SODIUM LACTATE, POTASSIUM CHLORIDE, AND CALCIUM CHLORIDE: 600; 310; 30; 20 INJECTION, SOLUTION INTRAVENOUS at 05:07

## 2021-07-15 RX ADMIN — Medication 100 MCG: at 03:07

## 2021-07-15 RX ADMIN — GABAPENTIN 125 MG: 250 SOLUTION ORAL at 10:07

## 2021-07-15 RX ADMIN — DEXMEDETOMIDINE HYDROCHLORIDE 8 MCG: 100 INJECTION, SOLUTION INTRAVENOUS at 03:07

## 2021-07-15 RX ADMIN — ACETAMINOPHEN 1000 MG: 10 INJECTION INTRAVENOUS at 02:07

## 2021-07-15 RX ADMIN — EPHEDRINE SULFATE 10 MG: 50 INJECTION INTRAVENOUS at 04:07

## 2021-07-15 RX ADMIN — ACETAMINOPHEN 1000 MG: 10 INJECTION INTRAVENOUS at 09:07

## 2021-07-15 RX ADMIN — SUCCINYLCHOLINE CHLORIDE 180 MG: 20 INJECTION, SOLUTION INTRAMUSCULAR; INTRAVENOUS at 01:07

## 2021-07-15 RX ADMIN — Medication 200 MCG: at 02:07

## 2021-07-15 RX ADMIN — MIDAZOLAM HYDROCHLORIDE 2 MG: 1 INJECTION, SOLUTION INTRAMUSCULAR; INTRAVENOUS at 01:07

## 2021-07-15 RX ADMIN — SODIUM CHLORIDE: 0.9 INJECTION, SOLUTION INTRAVENOUS at 01:07

## 2021-07-15 RX ADMIN — ROCURONIUM BROMIDE 30 MG: 10 INJECTION, SOLUTION INTRAVENOUS at 02:07

## 2021-07-15 RX ADMIN — CEFAZOLIN 2 G: 330 INJECTION, POWDER, FOR SOLUTION INTRAMUSCULAR; INTRAVENOUS at 01:07

## 2021-07-15 RX ADMIN — ROCURONIUM BROMIDE 20 MG: 10 INJECTION, SOLUTION INTRAVENOUS at 03:07

## 2021-07-15 RX ADMIN — Medication 150 MCG: at 02:07

## 2021-07-15 RX ADMIN — EPHEDRINE SULFATE 10 MG: 50 INJECTION INTRAVENOUS at 02:07

## 2021-07-15 RX ADMIN — DEXMEDETOMIDINE HYDROCHLORIDE 8 MCG: 100 INJECTION, SOLUTION INTRAVENOUS at 02:07

## 2021-07-15 RX ADMIN — OXYCODONE HYDROCHLORIDE 10 MG: 5 SOLUTION ORAL at 06:07

## 2021-07-15 RX ADMIN — DEXMEDETOMIDINE HYDROCHLORIDE 8 MCG: 100 INJECTION, SOLUTION INTRAVENOUS at 01:07

## 2021-07-15 RX ADMIN — LIDOCAINE HYDROCHLORIDE 100 MG: 20 INJECTION, SOLUTION INTRAVENOUS at 01:07

## 2021-07-15 RX ADMIN — PROPOFOL 30 MG: 10 INJECTION, EMULSION INTRAVENOUS at 03:07

## 2021-07-15 RX ADMIN — ROCURONIUM BROMIDE 20 MG: 10 INJECTION, SOLUTION INTRAVENOUS at 02:07

## 2021-07-15 RX ADMIN — PROPOFOL 200 MG: 10 INJECTION, EMULSION INTRAVENOUS at 01:07

## 2021-07-16 LAB
ALBUMIN SERPL BCP-MCNC: 3.6 G/DL (ref 3.5–5.2)
ALP SERPL-CCNC: 61 U/L (ref 55–135)
ALT SERPL W/O P-5'-P-CCNC: 58 U/L (ref 10–44)
ANION GAP SERPL CALC-SCNC: 8 MMOL/L (ref 8–16)
AST SERPL-CCNC: 41 U/L (ref 10–40)
BASOPHILS # BLD AUTO: 0.03 K/UL (ref 0–0.2)
BASOPHILS NFR BLD: 0.3 % (ref 0–1.9)
BILIRUB SERPL-MCNC: 0.5 MG/DL (ref 0.1–1)
BUN SERPL-MCNC: 18 MG/DL (ref 6–20)
CALCIUM SERPL-MCNC: 9.1 MG/DL (ref 8.7–10.5)
CHLORIDE SERPL-SCNC: 108 MMOL/L (ref 95–110)
CO2 SERPL-SCNC: 23 MMOL/L (ref 23–29)
CREAT SERPL-MCNC: 0.8 MG/DL (ref 0.5–1.4)
DIFFERENTIAL METHOD: ABNORMAL
EOSINOPHIL # BLD AUTO: 0 K/UL (ref 0–0.5)
EOSINOPHIL NFR BLD: 0 % (ref 0–8)
ERYTHROCYTE [DISTWIDTH] IN BLOOD BY AUTOMATED COUNT: 12.4 % (ref 11.5–14.5)
EST. GFR  (AFRICAN AMERICAN): >60 ML/MIN/1.73 M^2
EST. GFR  (NON AFRICAN AMERICAN): >60 ML/MIN/1.73 M^2
GLUCOSE SERPL-MCNC: 96 MG/DL (ref 70–110)
HCT VFR BLD AUTO: 36.7 % (ref 40–54)
HGB BLD-MCNC: 12.4 G/DL (ref 14–18)
IMM GRANULOCYTES # BLD AUTO: 0.02 K/UL (ref 0–0.04)
IMM GRANULOCYTES NFR BLD AUTO: 0.2 % (ref 0–0.5)
LYMPHOCYTES # BLD AUTO: 0.6 K/UL (ref 1–4.8)
LYMPHOCYTES NFR BLD: 6.3 % (ref 18–48)
MAGNESIUM SERPL-MCNC: 2 MG/DL (ref 1.6–2.6)
MCH RBC QN AUTO: 30.3 PG (ref 27–31)
MCHC RBC AUTO-ENTMCNC: 33.8 G/DL (ref 32–36)
MCV RBC AUTO: 90 FL (ref 82–98)
MONOCYTES # BLD AUTO: 0.6 K/UL (ref 0.3–1)
MONOCYTES NFR BLD: 6.8 % (ref 4–15)
NEUTROPHILS # BLD AUTO: 8.2 K/UL (ref 1.8–7.7)
NEUTROPHILS NFR BLD: 86.4 % (ref 38–73)
NRBC BLD-RTO: 0 /100 WBC
PHOSPHATE SERPL-MCNC: 2.8 MG/DL (ref 2.7–4.5)
PLATELET # BLD AUTO: 213 K/UL (ref 150–450)
PMV BLD AUTO: 9.9 FL (ref 9.2–12.9)
POTASSIUM SERPL-SCNC: 4.8 MMOL/L (ref 3.5–5.1)
PROT SERPL-MCNC: 6.1 G/DL (ref 6–8.4)
RBC # BLD AUTO: 4.09 M/UL (ref 4.6–6.2)
SODIUM SERPL-SCNC: 139 MMOL/L (ref 136–145)
WBC # BLD AUTO: 9.48 K/UL (ref 3.9–12.7)

## 2021-07-16 PROCEDURE — 25000003 PHARM REV CODE 250

## 2021-07-16 PROCEDURE — 84100 ASSAY OF PHOSPHORUS: CPT

## 2021-07-16 PROCEDURE — 80053 COMPREHEN METABOLIC PANEL: CPT

## 2021-07-16 PROCEDURE — 25000242 PHARM REV CODE 250 ALT 637 W/ HCPCS

## 2021-07-16 PROCEDURE — 83735 ASSAY OF MAGNESIUM: CPT

## 2021-07-16 PROCEDURE — 85025 COMPLETE CBC W/AUTO DIFF WBC: CPT

## 2021-07-16 PROCEDURE — 63600175 PHARM REV CODE 636 W HCPCS

## 2021-07-16 PROCEDURE — 25500020 PHARM REV CODE 255: Performed by: SURGERY

## 2021-07-16 RX ORDER — ENOXAPARIN SODIUM 100 MG/ML
40 INJECTION SUBCUTANEOUS EVERY 24 HOURS
Status: DISCONTINUED | OUTPATIENT
Start: 2021-07-16 | End: 2021-07-17 | Stop reason: HOSPADM

## 2021-07-16 RX ORDER — DEXTROSE MONOHYDRATE, SODIUM CHLORIDE, AND POTASSIUM CHLORIDE 50; 1.49; 4.5 G/1000ML; G/1000ML; G/1000ML
INJECTION, SOLUTION INTRAVENOUS CONTINUOUS
Status: DISCONTINUED | OUTPATIENT
Start: 2021-07-16 | End: 2021-07-17

## 2021-07-16 RX ADMIN — OXYCODONE HYDROCHLORIDE 10 MG: 5 SOLUTION ORAL at 04:07

## 2021-07-16 RX ADMIN — GABAPENTIN 125 MG: 250 SOLUTION ORAL at 11:07

## 2021-07-16 RX ADMIN — SODIUM CHLORIDE 3 G: 9 INJECTION, SOLUTION INTRAVENOUS at 11:07

## 2021-07-16 RX ADMIN — AMITRIPTYLINE HYDROCHLORIDE 25 MG: 25 TABLET, FILM COATED ORAL at 09:07

## 2021-07-16 RX ADMIN — DEXTROSE MONOHYDRATE, SODIUM CHLORIDE, AND POTASSIUM CHLORIDE: 50; 4.5; 1.49 INJECTION, SOLUTION INTRAVENOUS at 11:07

## 2021-07-16 RX ADMIN — SODIUM CHLORIDE, SODIUM LACTATE, POTASSIUM CHLORIDE, AND CALCIUM CHLORIDE: 600; 310; 30; 20 INJECTION, SOLUTION INTRAVENOUS at 03:07

## 2021-07-16 RX ADMIN — METHOCARBAMOL 500 MG: 100 INJECTION, SOLUTION INTRAMUSCULAR; INTRAVENOUS at 10:07

## 2021-07-16 RX ADMIN — ACETAMINOPHEN 1000 MG: 10 INJECTION INTRAVENOUS at 05:07

## 2021-07-16 RX ADMIN — METHOCARBAMOL 500 MG: 100 INJECTION, SOLUTION INTRAMUSCULAR; INTRAVENOUS at 04:07

## 2021-07-16 RX ADMIN — ONDANSETRON 8 MG: 2 INJECTION INTRAMUSCULAR; INTRAVENOUS at 03:07

## 2021-07-16 RX ADMIN — GABAPENTIN 125 MG: 250 SOLUTION ORAL at 05:07

## 2021-07-16 RX ADMIN — ACETAMINOPHEN 1000 MG: 10 INJECTION INTRAVENOUS at 03:07

## 2021-07-16 RX ADMIN — SODIUM CHLORIDE 3 G: 9 INJECTION, SOLUTION INTRAVENOUS at 12:07

## 2021-07-16 RX ADMIN — MUPIROCIN: 20 OINTMENT TOPICAL at 09:07

## 2021-07-16 RX ADMIN — METHOCARBAMOL 500 MG: 100 INJECTION, SOLUTION INTRAMUSCULAR; INTRAVENOUS at 05:07

## 2021-07-16 RX ADMIN — SODIUM CHLORIDE 3 G: 9 INJECTION, SOLUTION INTRAVENOUS at 06:07

## 2021-07-16 RX ADMIN — IOHEXOL 100 ML: 350 INJECTION, SOLUTION INTRAVENOUS at 01:07

## 2021-07-16 RX ADMIN — GABAPENTIN 125 MG: 250 SOLUTION ORAL at 03:07

## 2021-07-17 VITALS
WEIGHT: 270 LBS | HEART RATE: 69 BPM | SYSTOLIC BLOOD PRESSURE: 137 MMHG | OXYGEN SATURATION: 96 % | HEIGHT: 72 IN | DIASTOLIC BLOOD PRESSURE: 93 MMHG | TEMPERATURE: 97 F | RESPIRATION RATE: 18 BRPM | BODY MASS INDEX: 36.57 KG/M2

## 2021-07-17 LAB
ALBUMIN SERPL BCP-MCNC: 3.4 G/DL (ref 3.5–5.2)
ALP SERPL-CCNC: 56 U/L (ref 55–135)
ALT SERPL W/O P-5'-P-CCNC: 46 U/L (ref 10–44)
ANION GAP SERPL CALC-SCNC: 5 MMOL/L (ref 8–16)
AST SERPL-CCNC: 28 U/L (ref 10–40)
BASOPHILS # BLD AUTO: 0.05 K/UL (ref 0–0.2)
BASOPHILS NFR BLD: 0.9 % (ref 0–1.9)
BILIRUB SERPL-MCNC: 0.4 MG/DL (ref 0.1–1)
BUN SERPL-MCNC: 9 MG/DL (ref 6–20)
CALCIUM SERPL-MCNC: 9.2 MG/DL (ref 8.7–10.5)
CHLORIDE SERPL-SCNC: 109 MMOL/L (ref 95–110)
CO2 SERPL-SCNC: 26 MMOL/L (ref 23–29)
CREAT SERPL-MCNC: 0.7 MG/DL (ref 0.5–1.4)
DIFFERENTIAL METHOD: ABNORMAL
EOSINOPHIL # BLD AUTO: 0.1 K/UL (ref 0–0.5)
EOSINOPHIL NFR BLD: 1.5 % (ref 0–8)
ERYTHROCYTE [DISTWIDTH] IN BLOOD BY AUTOMATED COUNT: 12.7 % (ref 11.5–14.5)
EST. GFR  (AFRICAN AMERICAN): >60 ML/MIN/1.73 M^2
EST. GFR  (NON AFRICAN AMERICAN): >60 ML/MIN/1.73 M^2
GLUCOSE SERPL-MCNC: 94 MG/DL (ref 70–110)
HCT VFR BLD AUTO: 35 % (ref 40–54)
HGB BLD-MCNC: 11.8 G/DL (ref 14–18)
IMM GRANULOCYTES # BLD AUTO: 0.01 K/UL (ref 0–0.04)
IMM GRANULOCYTES NFR BLD AUTO: 0.2 % (ref 0–0.5)
LYMPHOCYTES # BLD AUTO: 0.8 K/UL (ref 1–4.8)
LYMPHOCYTES NFR BLD: 15.3 % (ref 18–48)
MAGNESIUM SERPL-MCNC: 1.8 MG/DL (ref 1.6–2.6)
MCH RBC QN AUTO: 29.9 PG (ref 27–31)
MCHC RBC AUTO-ENTMCNC: 33.7 G/DL (ref 32–36)
MCV RBC AUTO: 89 FL (ref 82–98)
MONOCYTES # BLD AUTO: 0.5 K/UL (ref 0.3–1)
MONOCYTES NFR BLD: 9.5 % (ref 4–15)
NEUTROPHILS # BLD AUTO: 4 K/UL (ref 1.8–7.7)
NEUTROPHILS NFR BLD: 72.6 % (ref 38–73)
NRBC BLD-RTO: 0 /100 WBC
PHOSPHATE SERPL-MCNC: 1.3 MG/DL (ref 2.7–4.5)
PLATELET # BLD AUTO: 182 K/UL (ref 150–450)
PMV BLD AUTO: 9.7 FL (ref 9.2–12.9)
POTASSIUM SERPL-SCNC: 4.3 MMOL/L (ref 3.5–5.1)
PROT SERPL-MCNC: 6 G/DL (ref 6–8.4)
RBC # BLD AUTO: 3.95 M/UL (ref 4.6–6.2)
SODIUM SERPL-SCNC: 140 MMOL/L (ref 136–145)
WBC # BLD AUTO: 5.49 K/UL (ref 3.9–12.7)

## 2021-07-17 PROCEDURE — 25000003 PHARM REV CODE 250

## 2021-07-17 PROCEDURE — 83735 ASSAY OF MAGNESIUM: CPT

## 2021-07-17 PROCEDURE — 84100 ASSAY OF PHOSPHORUS: CPT

## 2021-07-17 PROCEDURE — 36415 COLL VENOUS BLD VENIPUNCTURE: CPT

## 2021-07-17 PROCEDURE — 80053 COMPREHEN METABOLIC PANEL: CPT

## 2021-07-17 PROCEDURE — 63600175 PHARM REV CODE 636 W HCPCS

## 2021-07-17 PROCEDURE — 85025 COMPLETE CBC W/AUTO DIFF WBC: CPT

## 2021-07-17 RX ORDER — GABAPENTIN 250 MG/5ML
125 SOLUTION ORAL EVERY 8 HOURS
Qty: 37.5 ML | Refills: 0 | Status: SHIPPED | OUTPATIENT
Start: 2021-07-17 | End: 2021-09-10

## 2021-07-17 RX ORDER — METHOCARBAMOL 500 MG/1
500 TABLET, FILM COATED ORAL 4 TIMES DAILY
Qty: 20 TABLET | Refills: 0 | Status: SHIPPED | OUTPATIENT
Start: 2021-07-17 | End: 2021-07-22

## 2021-07-17 RX ORDER — LISINOPRIL 20 MG/1
40 TABLET ORAL DAILY
Status: DISCONTINUED | OUTPATIENT
Start: 2021-07-17 | End: 2021-07-17 | Stop reason: HOSPADM

## 2021-07-17 RX ORDER — OXYCODONE HCL 5 MG/5 ML
10 SOLUTION, ORAL ORAL EVERY 4 HOURS PRN
Qty: 420 ML | Refills: 0 | Status: SHIPPED | OUTPATIENT
Start: 2021-07-17 | End: 2021-07-24

## 2021-07-17 RX ORDER — ATENOLOL 50 MG/1
50 TABLET ORAL DAILY
Status: DISCONTINUED | OUTPATIENT
Start: 2021-07-17 | End: 2021-07-17 | Stop reason: HOSPADM

## 2021-07-17 RX ADMIN — SODIUM CHLORIDE 3 G: 9 INJECTION, SOLUTION INTRAVENOUS at 11:07

## 2021-07-17 RX ADMIN — METHOCARBAMOL 500 MG: 100 INJECTION, SOLUTION INTRAMUSCULAR; INTRAVENOUS at 04:07

## 2021-07-17 RX ADMIN — GABAPENTIN 125 MG: 250 SOLUTION ORAL at 04:07

## 2021-07-17 RX ADMIN — SODIUM CHLORIDE 3 G: 9 INJECTION, SOLUTION INTRAVENOUS at 05:07

## 2021-07-17 RX ADMIN — GABAPENTIN 125 MG: 250 SOLUTION ORAL at 01:07

## 2021-07-17 RX ADMIN — DEXTROSE MONOHYDRATE, SODIUM CHLORIDE, AND POTASSIUM CHLORIDE: 50; 4.5; 1.49 INJECTION, SOLUTION INTRAVENOUS at 09:07

## 2021-07-30 ENCOUNTER — OFFICE VISIT (OUTPATIENT)
Dept: SURGERY | Facility: CLINIC | Age: 51
End: 2021-07-30
Payer: COMMERCIAL

## 2021-07-30 VITALS
WEIGHT: 256.81 LBS | BODY MASS INDEX: 34.78 KG/M2 | HEART RATE: 68 BPM | DIASTOLIC BLOOD PRESSURE: 71 MMHG | HEIGHT: 72 IN | SYSTOLIC BLOOD PRESSURE: 115 MMHG

## 2021-07-30 DIAGNOSIS — Z09 POSTOP CHECK: Primary | ICD-10-CM

## 2021-07-30 PROCEDURE — 3008F PR BODY MASS INDEX (BMI) DOCUMENTED: ICD-10-PCS | Mod: CPTII,S$GLB,, | Performed by: SURGERY

## 2021-07-30 PROCEDURE — 99024 POSTOP FOLLOW-UP VISIT: CPT | Mod: S$GLB,,, | Performed by: SURGERY

## 2021-07-30 PROCEDURE — 3008F BODY MASS INDEX DOCD: CPT | Mod: CPTII,S$GLB,, | Performed by: SURGERY

## 2021-07-30 PROCEDURE — 1159F PR MEDICATION LIST DOCUMENTED IN MEDICAL RECORD: ICD-10-PCS | Mod: CPTII,S$GLB,, | Performed by: SURGERY

## 2021-07-30 PROCEDURE — 99999 PR PBB SHADOW E&M-EST. PATIENT-LVL III: ICD-10-PCS | Mod: PBBFAC,,, | Performed by: SURGERY

## 2021-07-30 PROCEDURE — 1160F PR REVIEW ALL MEDS BY PRESCRIBER/CLIN PHARMACIST DOCUMENTED: ICD-10-PCS | Mod: CPTII,S$GLB,, | Performed by: SURGERY

## 2021-07-30 PROCEDURE — 1159F MED LIST DOCD IN RCRD: CPT | Mod: CPTII,S$GLB,, | Performed by: SURGERY

## 2021-07-30 PROCEDURE — 99999 PR PBB SHADOW E&M-EST. PATIENT-LVL III: CPT | Mod: PBBFAC,,, | Performed by: SURGERY

## 2021-07-30 PROCEDURE — 1160F RVW MEDS BY RX/DR IN RCRD: CPT | Mod: CPTII,S$GLB,, | Performed by: SURGERY

## 2021-07-30 PROCEDURE — 3074F SYST BP LT 130 MM HG: CPT | Mod: CPTII,S$GLB,, | Performed by: SURGERY

## 2021-07-30 PROCEDURE — 3074F PR MOST RECENT SYSTOLIC BLOOD PRESSURE < 130 MM HG: ICD-10-PCS | Mod: CPTII,S$GLB,, | Performed by: SURGERY

## 2021-07-30 PROCEDURE — 3078F PR MOST RECENT DIASTOLIC BLOOD PRESSURE < 80 MM HG: ICD-10-PCS | Mod: CPTII,S$GLB,, | Performed by: SURGERY

## 2021-07-30 PROCEDURE — 1126F AMNT PAIN NOTED NONE PRSNT: CPT | Mod: CPTII,S$GLB,, | Performed by: SURGERY

## 2021-07-30 PROCEDURE — 3078F DIAST BP <80 MM HG: CPT | Mod: CPTII,S$GLB,, | Performed by: SURGERY

## 2021-07-30 PROCEDURE — 99024 PR POST-OP FOLLOW-UP VISIT: ICD-10-PCS | Mod: S$GLB,,, | Performed by: SURGERY

## 2021-07-30 PROCEDURE — 1126F PR PAIN SEVERITY QUANTIFIED, NO PAIN PRESENT: ICD-10-PCS | Mod: CPTII,S$GLB,, | Performed by: SURGERY

## 2021-08-02 ENCOUNTER — TELEPHONE (OUTPATIENT)
Dept: GASTROENTEROLOGY | Facility: CLINIC | Age: 51
End: 2021-08-02

## 2021-08-23 ENCOUNTER — TELEPHONE (OUTPATIENT)
Dept: SURGERY | Facility: CLINIC | Age: 51
End: 2021-08-23

## 2021-09-07 ENCOUNTER — TELEPHONE (OUTPATIENT)
Dept: SURGERY | Facility: CLINIC | Age: 51
End: 2021-09-07

## 2021-09-10 ENCOUNTER — OFFICE VISIT (OUTPATIENT)
Dept: GASTROENTEROLOGY | Facility: CLINIC | Age: 51
End: 2021-09-10
Payer: COMMERCIAL

## 2021-09-10 DIAGNOSIS — K22.0 ACHALASIA: Primary | ICD-10-CM

## 2021-09-10 DIAGNOSIS — R07.89 NON-CARDIAC CHEST PAIN: ICD-10-CM

## 2021-09-10 DIAGNOSIS — R13.10 DYSPHAGIA, UNSPECIFIED TYPE: ICD-10-CM

## 2021-09-10 DIAGNOSIS — K21.9 GASTROESOPHAGEAL REFLUX DISEASE, UNSPECIFIED WHETHER ESOPHAGITIS PRESENT: ICD-10-CM

## 2021-09-10 PROCEDURE — 4010F ACE/ARB THERAPY RXD/TAKEN: CPT | Mod: CPTII,,, | Performed by: INTERNAL MEDICINE

## 2021-09-10 PROCEDURE — 99215 OFFICE O/P EST HI 40 MIN: CPT | Mod: 95,,, | Performed by: INTERNAL MEDICINE

## 2021-09-10 PROCEDURE — 99215 PR OFFICE/OUTPT VISIT, EST, LEVL V, 40-54 MIN: ICD-10-PCS | Mod: 95,,, | Performed by: INTERNAL MEDICINE

## 2021-09-10 PROCEDURE — 4010F PR ACE/ARB THEARPY RXD/TAKEN: ICD-10-PCS | Mod: CPTII,,, | Performed by: INTERNAL MEDICINE

## 2021-09-29 NOTE — TELEPHONE ENCOUNTER
Pt informed  just resumed seeing new pts this month amidst pandemic so there is a back log. Pt on wait list and told he can call in check in whenever.   Brow Lift Text: A midfrontal incision was made medially to the defect to allow access to the tissues just superior to the left eyebrow. Following careful dissection inferiorly in a supraperiosteal plane to the level of the left eyebrow, several 3-0 monocryl sutures were used to resuspend the eyebrow orbicularis oculi muscular unit to the superior frontal bone periosteum. This resulted in an appropriate reapproximation of static eyebrow symmetry and correction of the left brow ptosis.

## 2022-11-28 ENCOUNTER — PATIENT MESSAGE (OUTPATIENT)
Dept: GASTROENTEROLOGY | Facility: CLINIC | Age: 52
End: 2022-11-28

## 2022-11-28 ENCOUNTER — TELEPHONE (OUTPATIENT)
Dept: GASTROENTEROLOGY | Facility: CLINIC | Age: 52
End: 2022-11-28

## 2022-11-28 ENCOUNTER — OFFICE VISIT (OUTPATIENT)
Dept: GASTROENTEROLOGY | Facility: CLINIC | Age: 52
End: 2022-11-28
Payer: COMMERCIAL

## 2022-11-28 DIAGNOSIS — R13.10 DYSPHAGIA, UNSPECIFIED TYPE: Primary | ICD-10-CM

## 2022-11-28 DIAGNOSIS — K22.0 ACHALASIA: ICD-10-CM

## 2022-11-28 PROCEDURE — 99213 PR OFFICE/OUTPT VISIT, EST, LEVL III, 20-29 MIN: ICD-10-PCS | Mod: 95,,, | Performed by: INTERNAL MEDICINE

## 2022-11-28 PROCEDURE — 99213 OFFICE O/P EST LOW 20 MIN: CPT | Mod: 95,,, | Performed by: INTERNAL MEDICINE

## 2022-11-28 PROCEDURE — 4010F ACE/ARB THERAPY RXD/TAKEN: CPT | Mod: CPTII,95,, | Performed by: INTERNAL MEDICINE

## 2022-11-28 PROCEDURE — 4010F PR ACE/ARB THEARPY RXD/TAKEN: ICD-10-PCS | Mod: CPTII,95,, | Performed by: INTERNAL MEDICINE

## 2022-11-28 RX ORDER — SILDENAFIL CITRATE 20 MG/1
20 TABLET ORAL 3 TIMES DAILY
Qty: 90 TABLET | Refills: 11 | Status: SHIPPED | OUTPATIENT
Start: 2022-11-28 | End: 2023-11-28

## 2022-11-28 NOTE — TELEPHONE ENCOUNTER
Called pt and reviewed AVS, copy sent to portal.  TBS scheduled and pre-procedure instructions reviewed with pt.  Fu ~ 1 year.
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
## 2022-11-28 NOTE — PATIENT INSTRUCTIONS
-Complete timed barium esophagogram   Barium esophagram is an X-ray of the esophagus. The patient drinks a liquid that contains barium (a silver-white metallic compound). The liquid coats the esophagus and X-rays are taken.    -Try sildenafil 20mg daily as needed with meals for difficulty swallowing   This medication may cause the following side effects.  Discontinue therapy and consult health care provider immediately or go to Emergency Department if you develop any pf the following side effects:   -Significant decreases in blood pressure  -Painful erection for more than 4 hours.  -Vision loss: Sudden loss of vision in one or both eyes, including permanent loss of vision    -Follow up with your local Gi doctor to arrange colonoscopy and EGD

## 2022-12-07 ENCOUNTER — HOSPITAL ENCOUNTER (OUTPATIENT)
Dept: RADIOLOGY | Facility: HOSPITAL | Age: 52
Discharge: HOME | End: 2022-12-07
Attending: INTERNAL MEDICINE
Payer: COMMERCIAL

## 2022-12-07 DIAGNOSIS — R13.10 DYSPHAGIA, UNSPECIFIED TYPE: ICD-10-CM

## 2022-12-07 PROCEDURE — 25500020 PHARM REV CODE 255: Performed by: INTERNAL MEDICINE

## 2022-12-07 PROCEDURE — 74220 FL ESOPHAGRAM COMPLETE: ICD-10-PCS | Mod: 26,,, | Performed by: RADIOLOGY

## 2022-12-07 PROCEDURE — A9698 NON-RAD CONTRAST MATERIALNOC: HCPCS | Performed by: INTERNAL MEDICINE

## 2022-12-07 PROCEDURE — 74220 X-RAY XM ESOPHAGUS 1CNTRST: CPT | Mod: 26,,, | Performed by: RADIOLOGY

## 2022-12-07 PROCEDURE — 74220 X-RAY XM ESOPHAGUS 1CNTRST: CPT | Mod: TC

## 2022-12-07 RX ADMIN — BARIUM SULFATE 130 ML: 0.6 SUSPENSION ORAL at 09:12

## 2022-12-07 RX ADMIN — BARIUM SULFATE 200 ML: 0.81 POWDER, FOR SUSPENSION ORAL at 09:12

## 2025-07-01 ENCOUNTER — HOSPITAL ENCOUNTER (OUTPATIENT)
Dept: RADIOLOGY | Facility: HOSPITAL | Age: 55
Discharge: HOME | End: 2025-07-01
Attending: FAMILY MEDICINE
Payer: COMMERCIAL

## 2025-07-01 DIAGNOSIS — M25.562 BILATERAL KNEE PAIN: ICD-10-CM

## 2025-07-01 DIAGNOSIS — M25.561 BILATERAL KNEE PAIN: ICD-10-CM

## 2025-07-01 PROCEDURE — 73565 X-RAY EXAM OF KNEES: CPT | Mod: TC

## 2025-07-02 ENCOUNTER — LAB VISIT (OUTPATIENT)
Dept: LAB | Facility: HOSPITAL | Age: 55
End: 2025-07-02
Attending: FAMILY MEDICINE
Payer: COMMERCIAL

## 2025-07-02 DIAGNOSIS — M25.50 ARTHRALGIA, UNSPECIFIED JOINT: ICD-10-CM

## 2025-07-02 DIAGNOSIS — D72.829 LEUKOCYTOSIS, UNSPECIFIED TYPE: Primary | ICD-10-CM

## 2025-07-02 LAB — POTASSIUM SERPL-SCNC: 5.3 MMOL/L (ref 3.5–5.1)

## 2025-07-02 PROCEDURE — 84132 ASSAY OF SERUM POTASSIUM: CPT

## 2025-07-02 PROCEDURE — 36415 COLL VENOUS BLD VENIPUNCTURE: CPT

## 2025-07-02 PROCEDURE — 87040 BLOOD CULTURE FOR BACTERIA: CPT

## 2025-07-08 LAB
BACTERIA BLD CULT: NORMAL
BACTERIA BLD CULT: NORMAL

## 2025-07-16 DIAGNOSIS — D64.9 ANEMIA, UNSPECIFIED TYPE: Primary | ICD-10-CM
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
## 2025-07-22 ENCOUNTER — OFFICE VISIT (OUTPATIENT)
Dept: HEMATOLOGY/ONCOLOGY | Facility: CLINIC | Age: 55
End: 2025-07-22
Payer: COMMERCIAL

## 2025-07-22 VITALS
TEMPERATURE: 98 F | OXYGEN SATURATION: 98 % | RESPIRATION RATE: 18 BRPM | BODY MASS INDEX: 29.04 KG/M2 | HEIGHT: 72 IN | HEART RATE: 82 BPM | WEIGHT: 214.38 LBS | SYSTOLIC BLOOD PRESSURE: 101 MMHG | DIASTOLIC BLOOD PRESSURE: 67 MMHG

## 2025-07-22 DIAGNOSIS — D64.9 ANEMIA, UNSPECIFIED TYPE: Primary | ICD-10-CM

## 2025-07-22 DIAGNOSIS — R63.4 WEIGHT LOSS, UNINTENTIONAL: ICD-10-CM

## 2025-07-22 LAB
ALBUMIN SERPL-MCNC: 3.6 G/DL (ref 3.5–5)
ALBUMIN/GLOB SERPL: 1.4 RATIO (ref 1.1–2)
ALP SERPL-CCNC: 50 UNIT/L (ref 40–150)
ALT SERPL-CCNC: 32 UNIT/L (ref 0–55)
ANION GAP SERPL CALC-SCNC: 8 MEQ/L
AST SERPL-CCNC: 21 UNIT/L (ref 11–45)
BASOPHILS # BLD AUTO: 0.04 X10(3)/MCL
BASOPHILS NFR BLD AUTO: 0.7 %
BILIRUB SERPL-MCNC: 0.2 MG/DL
BUN SERPL-MCNC: 13.7 MG/DL (ref 8.4–25.7)
CALCIUM SERPL-MCNC: 9.2 MG/DL (ref 8.4–10.2)
CHLORIDE SERPL-SCNC: 104 MMOL/L (ref 98–107)
CO2 SERPL-SCNC: 21 MMOL/L (ref 22–29)
CREAT SERPL-MCNC: 0.74 MG/DL (ref 0.72–1.25)
CREAT/UREA NIT SERPL: 19
CRP SERPL-MCNC: 1.2 MG/L
EOSINOPHIL # BLD AUTO: 0.06 X10(3)/MCL (ref 0–0.9)
EOSINOPHIL NFR BLD AUTO: 1 %
ERYTHROCYTE [DISTWIDTH] IN BLOOD BY AUTOMATED COUNT: 12.9 % (ref 11.5–17)
ERYTHROCYTE [SEDIMENTATION RATE] IN BLOOD: 3 MM/HR (ref 0–20)
FERRITIN SERPL-MCNC: 141.24 NG/ML (ref 21.81–274.66)
FOLATE SERPL-MCNC: 11.8 NG/ML (ref 7–31.4)
GFR SERPLBLD CREATININE-BSD FMLA CKD-EPI: >60 ML/MIN/1.73/M2
GLOBULIN SER-MCNC: 2.6 GM/DL (ref 2.4–3.5)
GLUCOSE SERPL-MCNC: 90 MG/DL (ref 74–100)
HCT VFR BLD AUTO: 33.7 % (ref 42–52)
HGB BLD-MCNC: 11.6 G/DL (ref 14–18)
IMM GRANULOCYTES # BLD AUTO: 0.01 X10(3)/MCL (ref 0–0.04)
IMM GRANULOCYTES NFR BLD AUTO: 0.2 %
IRON SATN MFR SERPL: 23 % (ref 20–50)
IRON SERPL-MCNC: 66 UG/DL (ref 65–175)
LDH SERPL-CCNC: 149 U/L (ref 125–220)
LYMPHOCYTES # BLD AUTO: 1 X10(3)/MCL (ref 0.6–4.6)
LYMPHOCYTES NFR BLD AUTO: 16.3 %
MCH RBC QN AUTO: 30.9 PG (ref 27–31)
MCHC RBC AUTO-ENTMCNC: 34.4 G/DL (ref 33–36)
MCV RBC AUTO: 89.6 FL (ref 80–94)
MONOCYTES # BLD AUTO: 0.57 X10(3)/MCL (ref 0.1–1.3)
MONOCYTES NFR BLD AUTO: 9.3 %
NEUTROPHILS # BLD AUTO: 4.46 X10(3)/MCL (ref 2.1–9.2)
NEUTROPHILS NFR BLD AUTO: 72.5 %
PLATELET # BLD AUTO: 257 X10(3)/MCL (ref 130–400)
PMV BLD AUTO: 8.2 FL (ref 7.4–10.4)
POTASSIUM SERPL-SCNC: 5.1 MMOL/L (ref 3.5–5.1)
PROT SERPL-MCNC: 6.2 GM/DL (ref 6.4–8.3)
RBC # BLD AUTO: 3.76 X10(6)/MCL (ref 4.7–6.1)
RET# (OHS): 0.07 X10E6/UL (ref 0.03–0.1)
RETICULOCYTE COUNT AUTOMATED (OLG): 1.81 % (ref 1.1–2.1)
SODIUM SERPL-SCNC: 133 MMOL/L (ref 136–145)
TIBC SERPL-MCNC: 216 UG/DL (ref 60–240)
TIBC SERPL-MCNC: 282 UG/DL (ref 250–450)
TRANSFERRIN SERPL-MCNC: 260 MG/DL (ref 174–364)
TSH SERPL-ACNC: 0.93 UIU/ML (ref 0.35–4.94)
VIT B12 SERPL-MCNC: 399 PG/ML (ref 213–816)
WBC # BLD AUTO: 6.14 X10(3)/MCL (ref 4.5–11.5)

## 2025-07-22 PROCEDURE — 85045 AUTOMATED RETICULOCYTE COUNT: CPT | Performed by: INTERNAL MEDICINE

## 2025-07-22 PROCEDURE — 84443 ASSAY THYROID STIM HORMONE: CPT | Performed by: INTERNAL MEDICINE

## 2025-07-22 PROCEDURE — 80053 COMPREHEN METABOLIC PANEL: CPT | Performed by: INTERNAL MEDICINE

## 2025-07-22 PROCEDURE — 4010F ACE/ARB THERAPY RXD/TAKEN: CPT | Mod: CPTII,S$GLB,, | Performed by: INTERNAL MEDICINE

## 2025-07-22 PROCEDURE — 99999 PR PBB SHADOW E&M-EST. PATIENT-LVL IV: CPT | Mod: PBBFAC,,, | Performed by: INTERNAL MEDICINE

## 2025-07-22 PROCEDURE — 85025 COMPLETE CBC W/AUTO DIFF WBC: CPT | Performed by: INTERNAL MEDICINE

## 2025-07-22 PROCEDURE — 99204 OFFICE O/P NEW MOD 45 MIN: CPT | Mod: S$GLB,,, | Performed by: INTERNAL MEDICINE

## 2025-07-22 PROCEDURE — 3078F DIAST BP <80 MM HG: CPT | Mod: CPTII,S$GLB,, | Performed by: INTERNAL MEDICINE

## 2025-07-22 PROCEDURE — 36415 COLL VENOUS BLD VENIPUNCTURE: CPT | Performed by: INTERNAL MEDICINE

## 2025-07-22 PROCEDURE — 82728 ASSAY OF FERRITIN: CPT | Performed by: INTERNAL MEDICINE

## 2025-07-22 PROCEDURE — 85652 RBC SED RATE AUTOMATED: CPT | Performed by: INTERNAL MEDICINE

## 2025-07-22 PROCEDURE — 83550 IRON BINDING TEST: CPT | Performed by: INTERNAL MEDICINE

## 2025-07-22 PROCEDURE — 1159F MED LIST DOCD IN RCRD: CPT | Mod: CPTII,S$GLB,, | Performed by: INTERNAL MEDICINE

## 2025-07-22 PROCEDURE — 83615 LACTATE (LD) (LDH) ENZYME: CPT | Performed by: INTERNAL MEDICINE

## 2025-07-22 PROCEDURE — 86140 C-REACTIVE PROTEIN: CPT | Performed by: INTERNAL MEDICINE

## 2025-07-22 PROCEDURE — 83921 ORGANIC ACID SINGLE QUANT: CPT | Performed by: INTERNAL MEDICINE

## 2025-07-22 PROCEDURE — 82746 ASSAY OF FOLIC ACID SERUM: CPT | Performed by: INTERNAL MEDICINE

## 2025-07-22 PROCEDURE — 3074F SYST BP LT 130 MM HG: CPT | Mod: CPTII,S$GLB,, | Performed by: INTERNAL MEDICINE

## 2025-07-22 PROCEDURE — 1160F RVW MEDS BY RX/DR IN RCRD: CPT | Mod: CPTII,S$GLB,, | Performed by: INTERNAL MEDICINE

## 2025-07-22 PROCEDURE — 82607 VITAMIN B-12: CPT | Performed by: INTERNAL MEDICINE

## 2025-07-22 PROCEDURE — 3008F BODY MASS INDEX DOCD: CPT | Mod: CPTII,S$GLB,, | Performed by: INTERNAL MEDICINE

## 2025-07-23 LAB — HEMATOLOGIST REVIEW: NORMAL

## 2025-07-26 LAB — METHYLMALONATE SERPL-SCNC: 0.2 NMOL/ML

## 2025-08-07 ENCOUNTER — TELEPHONE (OUTPATIENT)
Dept: HEMATOLOGY/ONCOLOGY | Facility: CLINIC | Age: 55
End: 2025-08-07
Payer: COMMERCIAL

## 2025-08-07 DIAGNOSIS — R93.5 ABNORMAL CT OF THE ABDOMEN: ICD-10-CM

## 2025-08-07 DIAGNOSIS — D64.9 ANEMIA, UNSPECIFIED TYPE: Primary | ICD-10-CM

## 2025-08-07 NOTE — TELEPHONE ENCOUNTER
New patient calling about recent CT abdomen results. Scan shows pancreatic mass and radiologist is recommending GI / surgical consult. He does not see Dr. Dorantes until 8/22/25 and would like to know if any intervention / further testing should be done before next visit. Please advise.

## 2025-08-08 ENCOUNTER — PATIENT MESSAGE (OUTPATIENT)
Dept: HEMATOLOGY/ONCOLOGY | Facility: CLINIC | Age: 55
End: 2025-08-08
Payer: COMMERCIAL

## 2025-08-19 DIAGNOSIS — K86.89 PANCREATIC MASS: Primary | ICD-10-CM

## 2025-08-21 ENCOUNTER — OFFICE VISIT (OUTPATIENT)
Dept: HEMATOLOGY/ONCOLOGY | Facility: CLINIC | Age: 55
End: 2025-08-21
Payer: COMMERCIAL

## 2025-08-21 VITALS
RESPIRATION RATE: 18 BRPM | WEIGHT: 216.13 LBS | TEMPERATURE: 98 F | DIASTOLIC BLOOD PRESSURE: 72 MMHG | BODY MASS INDEX: 29.27 KG/M2 | OXYGEN SATURATION: 98 % | HEIGHT: 72 IN | SYSTOLIC BLOOD PRESSURE: 110 MMHG | HEART RATE: 75 BPM

## 2025-08-21 DIAGNOSIS — K86.89 PANCREATIC MASS: ICD-10-CM

## 2025-08-21 DIAGNOSIS — R93.5 ABNORMAL CT OF THE ABDOMEN: ICD-10-CM

## 2025-08-21 DIAGNOSIS — R63.4 WEIGHT LOSS, UNINTENTIONAL: ICD-10-CM

## 2025-08-21 DIAGNOSIS — D64.9 ANEMIA, UNSPECIFIED TYPE: Primary | ICD-10-CM

## 2025-08-21 LAB
ALBUMIN SERPL-MCNC: 3.5 G/DL (ref 3.5–5)
ALBUMIN/GLOB SERPL: 1.4 RATIO (ref 1.1–2)
ALP SERPL-CCNC: 52 UNIT/L (ref 40–150)
ALT SERPL-CCNC: 28 UNIT/L (ref 0–55)
ANION GAP SERPL CALC-SCNC: 7 MEQ/L
AST SERPL-CCNC: 20 UNIT/L (ref 11–45)
BASOPHILS # BLD AUTO: 0.01 X10(3)/MCL
BASOPHILS NFR BLD AUTO: 0.3 %
BILIRUB SERPL-MCNC: 0.5 MG/DL
BUN SERPL-MCNC: 14.4 MG/DL (ref 8.4–25.7)
CALCIUM SERPL-MCNC: 8.8 MG/DL (ref 8.4–10.2)
CHLORIDE SERPL-SCNC: 103 MMOL/L (ref 98–107)
CO2 SERPL-SCNC: 23 MMOL/L (ref 22–29)
CREAT SERPL-MCNC: 0.76 MG/DL (ref 0.72–1.25)
CREAT/UREA NIT SERPL: 19
EOSINOPHIL # BLD AUTO: 0.02 X10(3)/MCL (ref 0–0.9)
EOSINOPHIL NFR BLD AUTO: 0.5 %
ERYTHROCYTE [DISTWIDTH] IN BLOOD BY AUTOMATED COUNT: 13 % (ref 11.5–17)
GFR SERPLBLD CREATININE-BSD FMLA CKD-EPI: >60 ML/MIN/1.73/M2
GLOBULIN SER-MCNC: 2.5 GM/DL (ref 2.4–3.5)
GLUCOSE SERPL-MCNC: 97 MG/DL (ref 74–100)
HCT VFR BLD AUTO: 32.2 % (ref 42–52)
HGB BLD-MCNC: 11.5 G/DL (ref 14–18)
IMM GRANULOCYTES # BLD AUTO: 0 X10(3)/MCL (ref 0–0.04)
IMM GRANULOCYTES NFR BLD AUTO: 0 %
LYMPHOCYTES # BLD AUTO: 0.76 X10(3)/MCL (ref 0.6–4.6)
LYMPHOCYTES NFR BLD AUTO: 20.5 %
MCH RBC QN AUTO: 31 PG (ref 27–31)
MCHC RBC AUTO-ENTMCNC: 35.7 G/DL (ref 33–36)
MCV RBC AUTO: 86.8 FL (ref 80–94)
MONOCYTES # BLD AUTO: 0.44 X10(3)/MCL (ref 0.1–1.3)
MONOCYTES NFR BLD AUTO: 11.9 %
NEUTROPHILS # BLD AUTO: 2.47 X10(3)/MCL (ref 2.1–9.2)
NEUTROPHILS NFR BLD AUTO: 66.8 %
PLATELET # BLD AUTO: 163 X10(3)/MCL (ref 130–400)
PMV BLD AUTO: 7.8 FL (ref 7.4–10.4)
POTASSIUM SERPL-SCNC: 4.7 MMOL/L (ref 3.5–5.1)
PROT SERPL-MCNC: 6 GM/DL (ref 6.4–8.3)
RBC # BLD AUTO: 3.71 X10(6)/MCL (ref 4.7–6.1)
SODIUM SERPL-SCNC: 133 MMOL/L (ref 136–145)
WBC # BLD AUTO: 3.7 X10(3)/MCL (ref 4.5–11.5)

## 2025-08-21 PROCEDURE — 99999 PR PBB SHADOW E&M-EST. PATIENT-LVL IV: CPT | Mod: PBBFAC,,, | Performed by: INTERNAL MEDICINE

## 2025-08-21 PROCEDURE — 1159F MED LIST DOCD IN RCRD: CPT | Mod: CPTII,S$GLB,, | Performed by: INTERNAL MEDICINE

## 2025-08-21 PROCEDURE — 80053 COMPREHEN METABOLIC PANEL: CPT | Performed by: INTERNAL MEDICINE

## 2025-08-21 PROCEDURE — 3008F BODY MASS INDEX DOCD: CPT | Mod: CPTII,S$GLB,, | Performed by: INTERNAL MEDICINE

## 2025-08-21 PROCEDURE — 3078F DIAST BP <80 MM HG: CPT | Mod: CPTII,S$GLB,, | Performed by: INTERNAL MEDICINE

## 2025-08-21 PROCEDURE — 1160F RVW MEDS BY RX/DR IN RCRD: CPT | Mod: CPTII,S$GLB,, | Performed by: INTERNAL MEDICINE

## 2025-08-21 PROCEDURE — 99214 OFFICE O/P EST MOD 30 MIN: CPT | Mod: S$GLB,,, | Performed by: INTERNAL MEDICINE

## 2025-08-21 PROCEDURE — 85025 COMPLETE CBC W/AUTO DIFF WBC: CPT | Performed by: INTERNAL MEDICINE

## 2025-08-21 PROCEDURE — 4010F ACE/ARB THERAPY RXD/TAKEN: CPT | Mod: CPTII,S$GLB,, | Performed by: INTERNAL MEDICINE

## 2025-08-21 PROCEDURE — 3074F SYST BP LT 130 MM HG: CPT | Mod: CPTII,S$GLB,, | Performed by: INTERNAL MEDICINE

## 2025-08-25 ENCOUNTER — TELEPHONE (OUTPATIENT)
Dept: HEMATOLOGY/ONCOLOGY | Facility: CLINIC | Age: 55
End: 2025-08-25
Payer: COMMERCIAL

## 2025-08-25 PROCEDURE — 83497 ASSAY OF 5-HIAA: CPT | Performed by: INTERNAL MEDICINE

## 2025-08-28 ENCOUNTER — HOSPITAL ENCOUNTER (OUTPATIENT)
Dept: RADIOLOGY | Facility: HOSPITAL | Age: 55
Discharge: HOME | End: 2025-08-28
Attending: INTERNAL MEDICINE
Payer: COMMERCIAL

## 2025-08-28 DIAGNOSIS — R93.5 ABNORMAL CT OF THE ABDOMEN: ICD-10-CM

## 2025-08-28 DIAGNOSIS — D64.9 ANEMIA, UNSPECIFIED TYPE: ICD-10-CM

## 2025-08-28 DIAGNOSIS — K86.89 PANCREATIC MASS: ICD-10-CM

## 2025-08-28 PROCEDURE — 78815 PET IMAGE W/CT SKULL-THIGH: CPT | Mod: TC,PI

## 2025-09-02 ENCOUNTER — OFFICE VISIT (OUTPATIENT)
Dept: SURGICAL ONCOLOGY | Facility: CLINIC | Age: 55
End: 2025-09-02
Payer: COMMERCIAL

## 2025-09-02 VITALS
HEIGHT: 72 IN | DIASTOLIC BLOOD PRESSURE: 65 MMHG | HEART RATE: 86 BPM | BODY MASS INDEX: 28.33 KG/M2 | SYSTOLIC BLOOD PRESSURE: 93 MMHG | WEIGHT: 209.19 LBS

## 2025-09-02 DIAGNOSIS — K86.89 PANCREATIC MASS: Primary | ICD-10-CM

## 2025-09-02 DIAGNOSIS — D3A.8 PRIMARY PANCREATIC NEUROENDOCRINE TUMOR: Primary | ICD-10-CM

## 2025-09-02 PROCEDURE — 3074F SYST BP LT 130 MM HG: CPT | Mod: CPTII,S$GLB,, | Performed by: SURGERY

## 2025-09-02 PROCEDURE — 99999 PR PBB SHADOW E&M-EST. PATIENT-LVL III: CPT | Mod: PBBFAC,,, | Performed by: SURGERY

## 2025-09-02 PROCEDURE — 3078F DIAST BP <80 MM HG: CPT | Mod: CPTII,S$GLB,, | Performed by: SURGERY

## 2025-09-02 PROCEDURE — 3008F BODY MASS INDEX DOCD: CPT | Mod: CPTII,S$GLB,, | Performed by: SURGERY

## 2025-09-02 PROCEDURE — 99205 OFFICE O/P NEW HI 60 MIN: CPT | Mod: S$GLB,,, | Performed by: SURGERY

## 2025-09-02 PROCEDURE — 1159F MED LIST DOCD IN RCRD: CPT | Mod: CPTII,S$GLB,, | Performed by: SURGERY

## 2025-09-02 PROCEDURE — 4010F ACE/ARB THERAPY RXD/TAKEN: CPT | Mod: CPTII,S$GLB,, | Performed by: SURGERY

## 2025-09-02 RX ORDER — MENINGOCOCCAL (GROUPS A, C, Y AND W-135) OLIGOSACCHARIDE DIPHTHERIA CRM197 CONJUGATE VACCINE 10; 5; 5; 5 UG/.5ML; UG/.5ML; UG/.5ML; UG/.5ML
0.5 INJECTION, SOLUTION INTRAMUSCULAR ONCE
Qty: 0.5 ML | Refills: 0 | Status: SHIPPED | OUTPATIENT
Start: 2025-09-02 | End: 2025-09-02

## 2025-09-04 DIAGNOSIS — D3A.8 PRIMARY PANCREATIC NEUROENDOCRINE TUMOR: Primary | ICD-10-CM

## 2025-09-04 DIAGNOSIS — D3A.8 NEUROENDOCRINE TUMOR OF PANCREAS: ICD-10-CM

## 2025-09-04 RX ORDER — ENOXAPARIN SODIUM 100 MG/ML
40 INJECTION SUBCUTANEOUS EVERY 24 HOURS
OUTPATIENT
Start: 2025-09-04

## 2025-09-04 RX ORDER — SODIUM CHLORIDE 9 MG/ML
INJECTION, SOLUTION INTRAVENOUS CONTINUOUS
OUTPATIENT
Start: 2025-09-04

## (undated) DEVICE — IRRIGATOR ENDOSCOPY DISP.

## (undated) DEVICE — NDL HYPO REG 25G X 1 1/2

## (undated) DEVICE — ENDOSTITCH INSTRUMENT

## (undated) DEVICE — LUBRICANT SURGILUBE 2 OZ

## (undated) DEVICE — SUT 0 VICRYL / UR6 (J603)

## (undated) DEVICE — TUBING HF INSUFFLATION W/ FLTR

## (undated) DEVICE — SYS SMOKE EVACUATION LAP

## (undated) DEVICE — DRAPE CORETEMP FLD WRM 56X62IN

## (undated) DEVICE — LOOP VESSEL BLUE MAXI

## (undated) DEVICE — TROCAR ENDOPATH XCEL 12MM 10CM

## (undated) DEVICE — SOL NS 1000CC

## (undated) DEVICE — ADHESIVE MASTISOL VIAL 48/BX

## (undated) DEVICE — CANNULA ENDOPATH XCEL 5X100MM

## (undated) DEVICE — SHEARS HARMONIC 5CM 36CM

## (undated) DEVICE — SUT SURGIDAC ENDO STITCH2-0

## (undated) DEVICE — SUT ENDOLOOP PDSII 18 LIGA

## (undated) DEVICE — ELECTRODE REM PLYHSV RETURN 9

## (undated) DEVICE — DRAPE ABDOMINAL TIBURON 14X11

## (undated) DEVICE — TROCAR ENDOPATH XCEL 5X100MM

## (undated) DEVICE — ADHESIVE DERMABOND ADVANCED

## (undated) DEVICE — TRAY MINOR GEN SURG

## (undated) DEVICE — SUT MCRYL PLUS 4-0 PS2 27IN

## (undated) DEVICE — SEE MEDLINE ITEM 152487

## (undated) DEVICE — TROCAR ENDOPATH XCEL 12X100MM